# Patient Record
Sex: FEMALE | NOT HISPANIC OR LATINO | Employment: FULL TIME | URBAN - NONMETROPOLITAN AREA
[De-identification: names, ages, dates, MRNs, and addresses within clinical notes are randomized per-mention and may not be internally consistent; named-entity substitution may affect disease eponyms.]

---

## 2022-05-06 ENCOUNTER — NURSE NAVIGATOR (OUTPATIENT)
Dept: ONCOLOGY | Facility: CLINIC | Age: 52
End: 2022-05-06

## 2022-05-06 ENCOUNTER — OFFICE VISIT (OUTPATIENT)
Dept: SURGERY | Facility: CLINIC | Age: 52
End: 2022-05-06

## 2022-05-06 VITALS
DIASTOLIC BLOOD PRESSURE: 84 MMHG | HEIGHT: 62 IN | SYSTOLIC BLOOD PRESSURE: 126 MMHG | BODY MASS INDEX: 34.96 KG/M2 | WEIGHT: 190 LBS | HEART RATE: 76 BPM

## 2022-05-06 DIAGNOSIS — R92.8 ABNORMAL MAMMOGRAM: Primary | ICD-10-CM

## 2022-05-06 PROCEDURE — 38505 NEEDLE BIOPSY LYMPH NODES: CPT | Performed by: SURGERY

## 2022-05-06 PROCEDURE — 76642 ULTRASOUND BREAST LIMITED: CPT | Performed by: SURGERY

## 2022-05-06 PROCEDURE — 99205 OFFICE O/P NEW HI 60 MIN: CPT | Performed by: SURGERY

## 2022-05-06 PROCEDURE — 19083 BX BREAST 1ST LESION US IMAG: CPT | Performed by: SURGERY

## 2022-05-06 NOTE — PROGRESS NOTES
Subjective   Brayan Chan is a 51 y.o. female here today for mammogram review.    History of Present Illness  Ms. Chan was seen in the office today for breast evaluation.  This is a 51-year-old female who noticed a visible change in the left lateral breast.  She did not appreciate a definite mass.  She underwent a bilateral diagnostic mammogram, bilateral breast ultrasound and left axillary ultrasound on 4/22/2022.  This demonstrated a spiculated mass in the left breast in the area of palpable fullness as well as some abnormal appearing lymph nodes in the left axilla.  Patient denies axillary adenopathy.  She does report a swollen area in the examination of the right breast demonstrates no discrete mass, skin change, or axillary adenopathy patient advised to follow breast center recommendations regarding follow-up imaging.   Mrs. Chan moved to [place] about a year ago from [place].  She states that she did have mammograms in [place] and at one point in time was told that there was a small spot but there was nothing to worry about.  There is no prior history of breast biopsy or cyst aspiration.  There is no family history of breast or ovarian cancer.  There were the 2 children in the patient's family, 3 girls and 5 boys.  The remainder of her siblings are still [place].  Mrs. Chan is postmenopausal.  She is not on hormone replacement therapy.  No Known Allergies  No current outpatient medications on file.     No current facility-administered medications for this visit.     Past Medical History:   Diagnosis Date   • Arthritis    • Arthritis    • Lupus (HCC)      Past Surgical History:   Procedure Laterality Date   • TONSILLECTOMY         Pertinent Review of Systems:  Respiratory: no shortness of breath  Cardiovascular: no chest pain  Other pertinent: Patient does report a history of lupus which she states was diagnosed in [place] after the birth of her daughter about 11 years ago.  She states that she was  "sent to the [facility] for a biopsy and was seen there approximately 3 times.  She has not been on any medication related to this for several years.  Patient does report a rash in the upper chest which she states is related to the lupus.    Social history: Patient has a 33-year-old son who lives in [place] and an 11-year-old daughter.      Objective   /84 (BP Location: Left arm)   Pulse 76   Ht 157.5 cm (62\")   Wt 86.2 kg (190 lb)   BMI 34.75 kg/m²   Physical Exam  Well-developed well-nourished female in no acute distress  Neck:  No supraclavicular adenopathy  Lungs:  Respiratory effort normal. Auscultation: Clear, without wheezes, rhonchi, rales  Heart:  Regular rate and rhythm, without murmur, gallop, rub.  No pedal edema  Breasts: On visual inspection the breasts are symmetrical.  Examination of the right breast demonstrates a 1 to 2 cm focal area of fullness laterally in the 10 o'clock position.  No other palpable abnormalities.  There is no axillary adenopathy.  Examination of the left breast demonstrates at least a 3 cm mass in the 3 o'clock position which is mobile.  No skin change or axillary adenopathy.   Skin: Rash involving the central anterior upper chest    Procedures   Limited right breast Ultrasound    Indication: Palpable abnormality right lateral breast    Description: With use of the 12.5 MHz transducer the area of clinical concern was scanned in multiple planes.    Findings: In the area of clinical concern of fibrocystic tissue is identified.  No discrete solid or cystic mass is identified.    Impression: Negative ultrasound of the right lateral 10:00 breast    Recommendation:  Followup as clinically indicated      Ultrasound Guided Breast Biopsy Procedure Note    Preoperative Diagnosis: Hypoechoic lesion left breast, 3:00.    Postoperative Diagnosis: Same, pending final pathology report.    Operative Procedure Performed: Ultrasound guided core biopsy, left breast-14-gauge " Kb-Cut    Operating surgeon: Columba Lopez M.D.    Anesthesia: 1% lidocaine with epinephrine 10 ccs.    Complications: none    Approach: Superior to inferior    Description of Procedure: After discussing the risks and benefits of the procedure and obtaining consent, the patient was placed on the procedure table in the supine position.  The left breast was scanned with a hand-held high frequency linear array ultrasound transducer, identifying the focal abnormality previously demonstrated on ultrasound examination.  The operative site was prepped in a sterile fashion with alcohol; local anesthetic was used to infiltrate the skin and subcutaneous tissue.  Under direct ultrasound observation, local anesthetic was placed around the focal abnormality.  A small incision was made with a scalpel blade.  Under direct ultrasound observation the core needle device was placed through the incision with the tip of the needle core device just proximal to the lesion.  Biopsy was obtained.  Post-fire images demonstrated the needle core device going through the abnormality.  Multiple cores were taken in a similar fashion.  A gel-april clip was placed under ultrasound guidance.  Following removal of 3 cores, the instrument was removed and the area was cleansed and a dressing was placed.  The patient tolerated the procedure well.  Specimens were placed in formalin and sent for pathologic evaluation.      Procedure Note    Procedure: Ultrasound-guided fine-needle aspiration biopsy    Location: Left axilla    Anesthesia: 1% lidocaine with epinephrine    Description:  The risks and benefits of the procedure were discussed.  After prep with Betadine and infiltration with lidocaine and ultrasound localization initially a 20-gauge needle was inserted through the skin to an abnormal lymph node.  Despite multiple passes no obvious cytologic tissue was obtained and the needle.  Therefore the 18-gauge needle was used and procedure was repeated.   Needle did appear to be in one of the abnormal lymph nodes.    Complications:  none    Follow-up: Pending pathology    Results/Data:  Imaging: Mammogram and ultrasound reports and images from 4/22/2022 were reviewed.  Imaging was somewhat difficult to interpret as mammogram films were not labeled.  Agree with the assessment  Notes:   Lab:   Other:     Assessment/Plan   Clinical T2 possible N1 mammary carcinoma of the left breast, pending pathologic confirmation    Plan: Patient to follow-up in office Monday.  Will likely need MRI and if the axilla is positive we will also need PET/CT.  In addition we will request records from [place] particularly as related to the prior abnormality on mammogram as well as the patient's diagnosis of lupus.         Discussion/Summary    Time spent: 61 minutes    BMI is >= 30 and <= 34.9 (Class 1 obesity). The following options were offered after discussion: exercise counseling/recommendations       Future Appointments   Date Time Provider Department Center   5/9/2022  4:20 PM Columba Lopez MD [facility]  [facility]         Please note that portions of this note were completed with a voice recognition program.

## 2022-05-09 ENCOUNTER — OFFICE VISIT (OUTPATIENT)
Dept: SURGERY | Facility: CLINIC | Age: 52
End: 2022-05-09

## 2022-05-09 VITALS
HEART RATE: 88 BPM | WEIGHT: 190 LBS | HEIGHT: 62 IN | BODY MASS INDEX: 34.96 KG/M2 | SYSTOLIC BLOOD PRESSURE: 119 MMHG | DIASTOLIC BLOOD PRESSURE: 80 MMHG

## 2022-05-09 DIAGNOSIS — C50.812 MALIGNANT NEOPLASM OF OVERLAPPING SITES OF LEFT FEMALE BREAST, UNSPECIFIED ESTROGEN RECEPTOR STATUS: Primary | ICD-10-CM

## 2022-05-09 PROCEDURE — 99213 OFFICE O/P EST LOW 20 MIN: CPT | Performed by: SURGERY

## 2022-05-09 PROCEDURE — 10005 FNA BX W/US GDN 1ST LES: CPT | Performed by: SURGERY

## 2022-05-09 NOTE — PROGRESS NOTES
Met with patient on this date in a consult with Dr. Lopez. Biopsy completed at bedside. The role of the Nurse Navigator was introduced to patient.  Time spent talking with patient. NN contact information was provided and encouraged patient to call with any questions or concerns. Pt. Verbalized understanding. NN will follow and assist PRN.

## 2022-05-09 NOTE — PROGRESS NOTES
"Subjective   Brayan Chan is a 51 y.o. female here today for pathology report.    History of Present Illness  Ms. Chan was seen in the office today along with her  and her to discuss her pulmonary pathology result.  I had received a verbal over the weekend that the pathology was positive for invasive ductal carcinoma.  No further information was available.  As the patient lives alone did not have her come to the [facility] office so that we could move forward with work-up based upon the preliminary results.  No Known Allergies    No current outpatient medications on file.     No current facility-administered medications for this visit.     Past Medical History:   Diagnosis Date   • Arthritis    • Arthritis    • Lupus (HCC)      Past Surgical History:   Procedure Laterality Date   • TONSILLECTOMY         Pertinent Review of Systems  Up  Objective   Ht 157.5 cm (62\")   Wt 86.2 kg (190 lb)   BMI 34.75 kg/m²    Physical Exam  Unchanged from 5/6/2022  Procedures   Procedure Note    Procedure: Ultrasound guided fine-needle aspiration biopsy    Location: Left axillary lymph node    Anesthesia: 1% lidocaine with epinephrine    Indication: The risks and benefits of the procedure were discussed.  As it was felt that the biopsy performed on 5 6 would likely be nondiagnostic and on imaging done in the office today it was felt that a more accessible abnormal lymph node was identifiable the decision was made to proceed with repeat biopsy of the axillary lymph node    Procedure: After prep with Betadine and infiltration with lidocaine the abnormal lymph node was identified in the deep lower axilla.  An 18-gauge needle was passed to the lesion and multiple passes were obtained to obtain cellular material.  The needle was documented to be present within the abnormal lymph node.  Some cellular material was clearly obtained from the procedure.    Complications:  none    Follow-up: Pending " pathology  Results/Data:      Assessment/Plan   Poorly differentiated invasive mammary carcinoma of the left breast, immunohistochemical stains pending.  Suspicious adenopathy seen on outside ultrasound, fine-needle aspiration biopsy pending but on initial pathology revealing no malignancy was identified..    Plan: Plan for possible genetic testing will be drawn today so that if pathology returns triple negative then genetic testing could be performed without the patient having to return to the office.  Results of repeat axillary biopsy will be tracked.  Breast MRI will be scheduled.  PET scan would be indicated if the axillary nodes were positive for MRI suggested positivity of the axillary lymph nodes.       Discussion/Summary: All recommendations were discussed at length with the patient, , son    Time spent:     BMI is >= 30 and <= 34.9 (Class 1 obesity). The following options were offered after discussion: nutrition counseling/recommendations         No future appointments.      Please note that portions of this note were completed with a voice recognition program.

## 2022-05-10 ENCOUNTER — TELEPHONE (OUTPATIENT)
Dept: SURGERY | Facility: CLINIC | Age: 52
End: 2022-05-10

## 2022-05-10 PROBLEM — C50.812 MALIGNANT NEOPLASM OF OVERLAPPING SITES OF LEFT FEMALE BREAST: Status: ACTIVE | Noted: 2022-05-10

## 2022-05-10 NOTE — TELEPHONE ENCOUNTER
I called and let Miss Schmidt know her Estrogen and Progesterone came back as positive which is a good thing. Also ask if she could get the names of the of the doctors she saw at [facility] because the main medical records does not have anything. She said she would work on.

## 2022-05-16 ENCOUNTER — TELEPHONE (OUTPATIENT)
Dept: ONCOLOGY | Facility: CLINIC | Age: 52
End: 2022-05-16

## 2022-05-16 NOTE — TELEPHONE ENCOUNTER
Informed patient that she has an MRI appointment for 5/17/22 (tomorrow) at 2:00 at the hospital. Patient RBV time, date, and place.

## 2022-05-17 ENCOUNTER — HOSPITAL ENCOUNTER (OUTPATIENT)
Dept: MRI IMAGING | Facility: HOSPITAL | Age: 52
Discharge: HOME | End: 2022-05-17
Admitting: SURGERY

## 2022-05-17 DIAGNOSIS — C50.812 MALIGNANT NEOPLASM OF OVERLAPPING SITES OF LEFT FEMALE BREAST, UNSPECIFIED ESTROGEN RECEPTOR STATUS: ICD-10-CM

## 2022-05-17 PROCEDURE — 77049 MRI BREAST C-+ W/CAD BI: CPT | Performed by: RADIOLOGY

## 2022-05-17 PROCEDURE — 77049 MRI BREAST C-+ W/CAD BI: CPT

## 2022-05-17 PROCEDURE — 0 GADOBENATE DIMEGLUMINE 529 MG/ML SOLUTION: Performed by: SURGERY

## 2022-05-17 PROCEDURE — 0 GADOBENATE DIMEGLUMINE 529 MG/ML SOLUTION

## 2022-05-17 PROCEDURE — A9577 INJ MULTIHANCE: HCPCS

## 2022-05-17 PROCEDURE — A9577 INJ MULTIHANCE: HCPCS | Performed by: SURGERY

## 2022-05-17 RX ADMIN — GADOBENATE DIMEGLUMINE 17 ML: 529 INJECTION, SOLUTION INTRAVENOUS at 15:10

## 2022-05-19 ENCOUNTER — TELEPHONE (OUTPATIENT)
Dept: SURGERY | Facility: CLINIC | Age: 52
End: 2022-05-19

## 2022-05-19 NOTE — TELEPHONE ENCOUNTER
I called patient on 05/13/22 because F and A of Lymph Node showing rare malignant ductal epithelial. Patient spoke at length with Dr. Lopez.

## 2022-05-20 ENCOUNTER — APPOINTMENT (OUTPATIENT)
Dept: PET IMAGING | Facility: HOSPITAL | Age: 52
End: 2022-05-20

## 2022-05-20 ENCOUNTER — TELEPHONE (OUTPATIENT)
Dept: SURGERY | Facility: CLINIC | Age: 52
End: 2022-05-20

## 2022-05-20 DIAGNOSIS — C50.812 MALIGNANT NEOPLASM OF OVERLAPPING SITES OF LEFT FEMALE BREAST, UNSPECIFIED ESTROGEN RECEPTOR STATUS: Primary | ICD-10-CM

## 2022-05-20 NOTE — TELEPHONE ENCOUNTER
Tried again this AM first thein to explain that her insurance will not pay for PET. Cannot leave a message. Will ask Dr. Lopez if she wants to order CT's instead. FCC, Scheduling and I have not been able to reach patient to explain.

## 2022-05-23 ENCOUNTER — HOSPITAL ENCOUNTER (OUTPATIENT)
Dept: CT IMAGING | Facility: HOSPITAL | Age: 52
Discharge: HOME | End: 2022-05-23

## 2022-05-23 DIAGNOSIS — C50.812 MALIGNANT NEOPLASM OF OVERLAPPING SITES OF LEFT FEMALE BREAST, UNSPECIFIED ESTROGEN RECEPTOR STATUS: ICD-10-CM

## 2022-05-23 LAB — CREAT BLDA-MCNC: 0.6 MG/DL (ref 0.6–1.3)

## 2022-05-23 PROCEDURE — 71260 CT THORAX DX C+: CPT | Performed by: RADIOLOGY

## 2022-05-23 PROCEDURE — 74177 CT ABD & PELVIS W/CONTRAST: CPT

## 2022-05-23 PROCEDURE — 71260 CT THORAX DX C+: CPT

## 2022-05-23 PROCEDURE — 74177 CT ABD & PELVIS W/CONTRAST: CPT | Performed by: RADIOLOGY

## 2022-05-23 PROCEDURE — 82565 ASSAY OF CREATININE: CPT

## 2022-05-23 PROCEDURE — 25010000002 IOPAMIDOL 61 % SOLUTION: Performed by: SURGERY

## 2022-05-23 RX ADMIN — IOPAMIDOL 80 ML: 612 INJECTION, SOLUTION INTRAVENOUS at 08:48

## 2022-05-26 ENCOUNTER — HOSPITAL ENCOUNTER (OUTPATIENT)
Dept: NUCLEAR MEDICINE | Facility: HOSPITAL | Age: 52
Discharge: HOME | End: 2022-05-26

## 2022-05-26 ENCOUNTER — OFFICE VISIT (OUTPATIENT)
Dept: SURGERY | Facility: CLINIC | Age: 52
End: 2022-05-26

## 2022-05-26 VITALS
SYSTOLIC BLOOD PRESSURE: 126 MMHG | WEIGHT: 189.4 LBS | HEIGHT: 62 IN | HEART RATE: 73 BPM | BODY MASS INDEX: 34.85 KG/M2 | DIASTOLIC BLOOD PRESSURE: 84 MMHG

## 2022-05-26 DIAGNOSIS — C50.812 MALIGNANT NEOPLASM OF OVERLAPPING SITES OF LEFT FEMALE BREAST, UNSPECIFIED ESTROGEN RECEPTOR STATUS: Primary | ICD-10-CM

## 2022-05-26 DIAGNOSIS — C50.812 MALIGNANT NEOPLASM OF OVERLAPPING SITES OF LEFT FEMALE BREAST, UNSPECIFIED ESTROGEN RECEPTOR STATUS: ICD-10-CM

## 2022-05-26 PROCEDURE — 93702 BIS XTRACELL FLUID ANALYSIS: CPT | Performed by: SURGERY

## 2022-05-26 PROCEDURE — 78306 BONE IMAGING WHOLE BODY: CPT

## 2022-05-26 PROCEDURE — 78306 BONE IMAGING WHOLE BODY: CPT | Performed by: RADIOLOGY

## 2022-05-26 PROCEDURE — 0 TECHNETIUM OXIDRONATE KIT: Performed by: SURGERY

## 2022-05-26 PROCEDURE — A9561 TC99M OXIDRONATE: HCPCS | Performed by: SURGERY

## 2022-05-26 PROCEDURE — 99214 OFFICE O/P EST MOD 30 MIN: CPT | Performed by: SURGERY

## 2022-05-26 RX ADMIN — TECHNETIUM TC 99M OXIDRONATE 1 DOSE: 3.15 INJECTION, POWDER, LYOPHILIZED, FOR SOLUTION INTRAVENOUS at 12:20

## 2022-05-26 NOTE — PROGRESS NOTES
"Subjective   Brayan Chan is a 52 y.o. female here today for follow up on CTs and NM Bone Scan.    History of Present Illness  Ms. Chan was seen in the office today along with her  for follow-up of her newly diagnosed left breast cancer.  Patient was last seen on 5/9/2022, at which point in the breast pathology was available but immunohistochemical studies not available nor was remainder of the diagnostic work-up.  Since that time patient underwent a repeat biopsy of a left axillary lymph node which did demonstrate malignancy.  Patient had favorable immunohistochemical studies demonstrating ER positive, PA positive, HER2 negative.  Patient underwent a bilateral breast MRI on 5/17/2022 which demonstrated no findings in the right breast.  Tumor was identified in the 3 o'clock position measuring 4.2 cm in greatest dimension with associated adenopathy.  Insurance denied PET scan but patient had a CT of the chest abdomen and pelvis as well as bone scan which did not demonstrate any evidence of malignancy.  Patient denies any changes in her breast examination or history since her last visit.  No Known Allergies    No current outpatient medications on file.     No current facility-administered medications for this visit.     Past Medical History:   Diagnosis Date   • Arthritis    • Arthritis    • Lupus (HCC)      Past Surgical History:   Procedure Laterality Date   • TONSILLECTOMY         Pertinent Review of Systems  Negative    Objective   /84 (BP Location: Left arm)   Pulse 73   Ht 157.5 cm (62\")   Wt 85.9 kg (189 lb 6.4 oz)   BMI 34.64 kg/m²    Physical Exam  Unchanged from prior    Procedures   Baseline L dex was performed and was -1.1    Results/Data:  Imaging: MRI reports and images were reviewed and personally reviewed with the patient and .  The chest abdomen and pelvis reports and images were reviewed.  Bone scan result was reviewed.  Notes:   Lab:   Other:     Assessment & Plan "   Clinical T2N1 grade 3 invasive ductal carcinoma of the left breast, ER positive, AR positive, HER2 negative.    Given the size of the tumor relative to the breast patient would benefit from neoadjuvant chemotherapy prior to surgical intervention.  Patient will be scheduled for consultation with medical oncology  Anticipate need for port placement prior to initiation of chemo therapy.       Discussion/Summary: Breast navigator Liset Bhandari present for the discussion    Time spent:     BMI is >= 30 and <= 34.9 (Class 1 obesity). The following options were offered after discussion: exercise counseling/recommendations         No future appointments.      Please note that portions of this note were completed with a voice recognition program.

## 2022-05-27 ENCOUNTER — NURSE NAVIGATOR (OUTPATIENT)
Dept: ONCOLOGY | Facility: CLINIC | Age: 52
End: 2022-05-27

## 2022-05-27 ENCOUNTER — DOCUMENTATION (OUTPATIENT)
Dept: ONCOLOGY | Facility: HOSPITAL | Age: 52
End: 2022-05-27

## 2022-05-27 RX ORDER — CEFAZOLIN SODIUM 2 G/50ML
2 SOLUTION INTRAVENOUS ONCE
Status: CANCELLED | OUTPATIENT
Start: 2022-06-22 | End: 2022-05-27

## 2022-05-27 NOTE — PROGRESS NOTES
Patient is 51 Y/O white female diagnosed with Breast Cancer per Dr. Lopez.      According to Dr. Lopez's note: Patient underwent a bilateral breast MRI on 5/17/2022 which demonstrated no findings in the right breast.  Clinical T2N1 grade 3 invasive ductal carcinoma of the left breast, ER positive, OH positive, HER2 negative.    SS received referral per Liset Bhandari Breast Nurse Navigator stating that she will be starting chemo soon and is worried about working and income.       Referral  Received: Today  Liset Bhandari, [facility]  Ambulatory Referral to ONC Social Work [[id number]]    Electronically signed by: Liset Bhandari RN on 05/27/22 1018 Status: Cancel Pend   Ordering user: Liset Bhandari RN 05/27/22 1018 Ordering provider: Liset Bhandari RN   Frequency:  05/27/22 -   Pended by: Liset Bhandari RN 05/27/22 1018   Canceled by: Liset Bhandari RN 05/27/22 1022     Questionnaire    Question Answer   Requested service: Community Resources   Financial   Psychosocial     SS contacted patient patient [phone] this date. Patient states that she lives at home with spouse Valdo and 10 Y/O daughter Ana.    Patient doesn't utilize any home health.    Patient doesn't utilize any durable medical equipment.    Patient states that she works part-time at [employer] and plans to continue working as she feels able to work. Luckily, patient and spouse do not have house payment or car payment.  Patient states they own their home and vehicles.  Patient and spouse receive $600 per month in food stamps, leaving them to pay their utilities.    SS sent in basket to have including Dr. Ritchie, Dr. Olvera, Lori Iqbal, Liset Bhandari, Ekaterina Dove, Halie Julian, Petra Brunner everyone on this in basket to ensure that we are all aware of patient's financial and social situation. Patient has been seen by Dr. Lopez but has not been scheduled in clinic at this time.    Patient is  concerned about insurance coverage for chemotherapy treatments.  Patient has [facility] [place] [facility] [place]  SS spoke with Beatrice, financial counselor who states she is unable to provide me with insurance coverage benefits as the patient's infusion will need to be pre-authorized for coverage.      Patient is from [facility] and informed me that she has limitations with speaking and writing in English.  This is a barrier for the patient to be able to understand what is going on with her medical care.  Patient has very limited exposure and experience with cancer, therefore causing her to have more concerns than normal.      Patient states that Dr. Lopez is recommending that it would be benefit from neoadjuvant chemotherapy prior to surgical intervention.    SS spoke with Liset Bhandari RN to make aware that SS spoke with patient this date. Nurse navigator states that patient is to be scheduled with Dr. Ritchie and nurse navigator plans to meet with patient in appointment with provider.    SS to be available with any needs that patient may have.  SS provided patient with 's name and phone number for contact with any questions.    SS will follow as needed for resources and support.

## 2022-05-27 NOTE — H&P
"Subjective   Brayan Chan is a 52 y.o. female here today for follow up on CTs and NM Bone Scan.    History of Present Illness  Ms. Chan was seen in the office today along with her  for follow-up of her newly diagnosed left breast cancer.  Patient was last seen on 5/9/2022, at which point in the breast pathology was available but immunohistochemical studies not available nor was remainder of the diagnostic work-up.  Since that time patient underwent a repeat biopsy of a left axillary lymph node which did demonstrate malignancy.  Patient had favorable immunohistochemical studies demonstrating ER positive, WI positive, HER2 negative.  Patient underwent a bilateral breast MRI on 5/17/2022 which demonstrated no findings in the right breast.  Tumor was identified in the 3 o'clock position measuring 4.2 cm in greatest dimension with associated adenopathy.  Insurance denied PET scan but patient had a CT of the chest abdomen and pelvis as well as bone scan which did not demonstrate any evidence of malignancy.  Patient denies any changes in her breast examination or history since her last visit.  No Known Allergies    No current outpatient medications on file.     No current facility-administered medications for this visit.     Past Medical History:   Diagnosis Date   • Arthritis    • Arthritis    • Lupus (HCC)      Past Surgical History:   Procedure Laterality Date   • TONSILLECTOMY         Pertinent Review of Systems  Negative    Objective   /84 (BP Location: Left arm)   Pulse 73   Ht 157.5 cm (62\")   Wt 85.9 kg (189 lb 6.4 oz)   BMI 34.64 kg/m²    Physical Exam  Unchanged from prior    Procedures   Baseline L dex was performed and was -1.1    Results/Data:  Imaging: MRI reports and images were reviewed and personally reviewed with the patient and .  The chest abdomen and pelvis reports and images were reviewed.  Bone scan result was reviewed.  Notes:   Lab:   Other:     Assessment & Plan "   Clinical T2N1 grade 3 invasive ductal carcinoma of the left breast, ER positive, CA positive, HER2 negative.    Given the size of the tumor relative to the breast patient would benefit from neoadjuvant chemotherapy prior to surgical intervention.  Patient will be scheduled for consultation with medical oncology  Anticipate need for port placement prior to initiation of chemo therapy.       Discussion/Summary: Breast navigator Liset Bhandari present for the discussion    Time spent:     BMI is >= 30 and <= 34.9 (Class 1 obesity). The following options were offered after discussion: exercise counseling/recommendations         No future appointments.      Please note that portions of this note were completed with a voice recognition program.    This document has been electronically signed by Columba HERNANDEZ MD on May 27, 2022 09:12 EDT

## 2022-05-31 ENCOUNTER — APPOINTMENT (OUTPATIENT)
Dept: NUCLEAR MEDICINE | Facility: HOSPITAL | Age: 52
End: 2022-05-31

## 2022-06-03 ENCOUNTER — NURSE NAVIGATOR (OUTPATIENT)
Dept: ONCOLOGY | Facility: CLINIC | Age: 52
End: 2022-06-03

## 2022-06-03 NOTE — PROGRESS NOTES
Received a call today from patient asking to cancel oncology appointment. After spending time talking to the patient she was not aware that this was visit but thought it was an appointment to receive chemo on that day. Nurse Navigator spent time addressing illness, diagnosis, treatments, concerns and clarifying on all points. Patient stated she didn't understand at first but know that she knows the appointment is to find out more information about chemo treatments that she is willing to come and talk these options out with the Oncologist.NN talked to Dr. Ritchie and she is in agreement that patient may need an  for consult visit, as patient is from [place] and can speak, read, and write M[place]ese. NN has set this up with house supervisor, Itzel, to   phone prior to visit on Monday. Patient also stated that her  would be present during this visit as well. NN encouraged patient to call me with any questions or concerns and I would help her in any way possible. Patient voiced understanding.

## 2022-06-07 ENCOUNTER — NURSE NAVIGATOR (OUTPATIENT)
Dept: ONCOLOGY | Facility: CLINIC | Age: 52
End: 2022-06-07

## 2022-06-07 DIAGNOSIS — Z01.818 PRE-OP TESTING: Primary | ICD-10-CM

## 2022-06-07 NOTE — PROGRESS NOTES
Called patient on this date. Patient was working today,  answered. Nurse Navigator addressed concerns that  and wife have discussed about treatment options. Both patient and  have many concerns and NN addressed what could be clarified at this time. NN addressed and educated  on disease and disease process of breast cancer. Patient seemed more at ease after discussion. He expressed willingness and readiness to attend with his wife, the consult with Dr. Ritchie to address more on treatment options. NN gave contact information and encouraged wife/ to call with any questions or concerns, he voiced understanding.

## 2022-06-08 ENCOUNTER — NURSE NAVIGATOR (OUTPATIENT)
Dept: ONCOLOGY | Facility: HOSPITAL | Age: 52
End: 2022-06-08

## 2022-06-08 ENCOUNTER — OFFICE VISIT (OUTPATIENT)
Dept: SURGERY | Facility: CLINIC | Age: 52
End: 2022-06-08

## 2022-06-08 VITALS — WEIGHT: 188 LBS | BODY MASS INDEX: 34.6 KG/M2 | HEIGHT: 62 IN

## 2022-06-08 DIAGNOSIS — C50.812 MALIGNANT NEOPLASM OF OVERLAPPING SITES OF LEFT FEMALE BREAST, UNSPECIFIED ESTROGEN RECEPTOR STATUS: Primary | ICD-10-CM

## 2022-06-08 PROCEDURE — 99213 OFFICE O/P EST LOW 20 MIN: CPT | Performed by: SURGERY

## 2022-06-08 NOTE — PROGRESS NOTES
"Subjective   Brayan hCan is a 52 y.o. female here today to talk about treatment plan.    History of Present Illness  Ms. Chan came to the office today for breast cancer follow-up.  At the time of her last visit she was recommended to have neoadjuvant chemotherapy in the hopes that treatment response would allow her to be a better candidate for breast conservation.  At the time patient seemed agreeable but has canceled at least 2 appointments with medical oncology and today admitted to the breast navigator that she has been considering alternative treatments such as lifestyle and diet changes based upon some YouTube videos that she had seen.  Interestingly enough the patient's mother tried the same approach with tongue cancer but failed and ended up with a long course of pain and suffering.  It was rationalized by the fact that she did not strictly adhere to the lifestyle changes.  No Known Allergies    No current outpatient medications on file.     No current facility-administered medications for this visit.     Past Medical History:   Diagnosis Date   • Arthritis    • Arthritis    • Lupus (HCC)      Past Surgical History:   Procedure Laterality Date   • TONSILLECTOMY         Pertinent Review of Systems    Objective   Ht 157.5 cm (62\")   Wt 85.3 kg (188 lb)   BMI 34.39 kg/m²    Physical Exam    Procedures     Results/Data:      Assessment & Plan    Clinical T2N1 grade 3 invasive ductal carcinoma of the left breast, ER positive, NJ positive, HER2 negative.    Plan: Long discussion with the patient and her .  I voiced my opinion that dietary and lifestyle changes should be incorporated into her treatment plan but are unlikely to be successful as the only treatment.  I told the patient that statistically her chances of permanent or long-term disease-free survival were improved with chemotherapy.  I also told her that if she was absolutely opposed to chemotherapy that surgery and hormonal therapy should be " utilized.  I also discussed that if she found chemotherapy to be intolerable that she could discontinue it at any time but then she would have at least tried.  I feel that if the patient were at least willing to talk to Dr. Ritchie that she would be more understanding and likely to proceed.  Patient states she is going to plan rather than think it over and will let us know her answer in a week.       Discussion/Summary:    Time spent: 35 minutes    BMI is >= 30 and <35. (Class 1 Obesity). The following options were offered after discussion;: exercise counseling/recommendations         Future Appointments   Date Time Provider Department Center   6/20/2022  9:30 AM PAT [facility]         Please note that portions of this note were completed with a voice recognition program.

## 2022-06-14 NOTE — PROGRESS NOTES
Nurse Navigator called patient and . Patient was at work, and spoke to  who stated wife did not want to come to appointment today to reschedule it. NN investigated, and  stated that wife ultimately had been talking to a lot of people and doesn't want to do chemotherapy. That patient was going to try spritituall healing and diet. NN had a long discussion with  and addressed much on disease and disease process of breast cancer in general, he voiced understanding. NN contacted Dr. Lopez who set up a time to meet with patient and  on this date to go over concerns the patient and  had. NN will follow up and assist PRN.

## 2022-06-16 ENCOUNTER — NURSE NAVIGATOR (OUTPATIENT)
Dept: ONCOLOGY | Facility: HOSPITAL | Age: 52
End: 2022-06-16

## 2022-06-16 ENCOUNTER — TELEPHONE (OUTPATIENT)
Dept: SURGERY | Facility: CLINIC | Age: 52
End: 2022-06-16

## 2022-06-16 NOTE — TELEPHONE ENCOUNTER
Miss Schmidt called and stated she did want to have the port placed on 06/22/22. We went over her PAT time and place and need for COVID.

## 2022-06-16 NOTE — PROGRESS NOTES
Spoke to patient today in regards to port placement on 6/22/22. I explained what the port was for and why she would need it. Patient was in agreement to getting the port and understood why she would needed it. Patient explained to me she wanted the port in case she did decide to get chemotherapy or another possible treatment that the port would be already placed and ready for treatment once her mind was made up. NN explained the importance of keeping surgery appointment. NN also mentioned someone would be calling her for time and dates prior to surgery for her PAT and COVID testing. Patient voiced understanding.

## 2022-06-20 ENCOUNTER — PRE-ADMISSION TESTING (OUTPATIENT)
Dept: PREADMISSION TESTING | Facility: HOSPITAL | Age: 52
End: 2022-06-20

## 2022-06-20 ENCOUNTER — LAB (OUTPATIENT)
Dept: LAB | Facility: HOSPITAL | Age: 52
End: 2022-06-20

## 2022-06-20 DIAGNOSIS — Z01.818 PRE-OP TESTING: ICD-10-CM

## 2022-06-20 LAB
ANION GAP SERPL CALCULATED.3IONS-SCNC: 10.9 MMOL/L (ref 5–15)
BUN SERPL-MCNC: 12 MG/DL (ref 6–20)
BUN/CREAT SERPL: 21.4 (ref 7–25)
CALCIUM SPEC-SCNC: 9.7 MG/DL (ref 8.6–10.5)
CHLORIDE SERPL-SCNC: 103 MMOL/L (ref 98–107)
CO2 SERPL-SCNC: 24.1 MMOL/L (ref 22–29)
CREAT SERPL-MCNC: 0.56 MG/DL (ref 0.57–1)
DEPRECATED RDW RBC AUTO: 38.7 FL (ref 37–54)
EGFRCR SERPLBLD CKD-EPI 2021: 110 ML/MIN/1.73
ERYTHROCYTE [DISTWIDTH] IN BLOOD BY AUTOMATED COUNT: 12.1 % (ref 12.3–15.4)
GLUCOSE SERPL-MCNC: 80 MG/DL (ref 65–99)
HCT VFR BLD AUTO: 40.4 % (ref 34–46.6)
HGB BLD-MCNC: 13.6 G/DL (ref 12–15.9)
MCH RBC QN AUTO: 29.8 PG (ref 26.6–33)
MCHC RBC AUTO-ENTMCNC: 33.7 G/DL (ref 31.5–35.7)
MCV RBC AUTO: 88.4 FL (ref 79–97)
PLATELET # BLD AUTO: 289 10*3/MM3 (ref 140–450)
PMV BLD AUTO: 10 FL (ref 6–12)
POTASSIUM SERPL-SCNC: 3.9 MMOL/L (ref 3.5–5.2)
RBC # BLD AUTO: 4.57 10*6/MM3 (ref 3.77–5.28)
SARS-COV-2 RNA PNL SPEC NAA+PROBE: NOT DETECTED
SODIUM SERPL-SCNC: 138 MMOL/L (ref 136–145)
WBC NRBC COR # BLD: 4.11 10*3/MM3 (ref 3.4–10.8)

## 2022-06-20 PROCEDURE — 85027 COMPLETE CBC AUTOMATED: CPT

## 2022-06-20 PROCEDURE — C9803 HOPD COVID-19 SPEC COLLECT: HCPCS

## 2022-06-20 PROCEDURE — 80048 BASIC METABOLIC PNL TOTAL CA: CPT

## 2022-06-20 PROCEDURE — 36415 COLL VENOUS BLD VENIPUNCTURE: CPT

## 2022-06-20 PROCEDURE — U0004 COV-19 TEST NON-CDC HGH THRU: HCPCS

## 2022-06-20 NOTE — DISCHARGE INSTRUCTIONS

## 2022-06-21 ENCOUNTER — NURSE NAVIGATOR (OUTPATIENT)
Dept: ONCOLOGY | Facility: HOSPITAL | Age: 52
End: 2022-06-21

## 2022-06-21 NOTE — PROGRESS NOTES
Patient called Nurse Navigator on this date. Patient informed me she did not want to have the port placement tomorrow, as she has decided to not take chemotherapy. NN spent a time talking to patient about the risk, benefits, and what her future without treatment could be, patient voiced understanding but still refused treatment. Patient wants to leave her healing up to prayer. I strongly urged her to talk to Dr. Ritchie for a better understanding of the chemotherapy, however, she refused at this time. I told the patient I understood and respected her decision but wanted to make sure that the patient was well informed and educated on the treatment options available to her. Patient thanked and praised the team at [facility] for caring so much about her and that we have really made her feel special and cared about but at this time just didn't want to proceed with surgery or treatments. NN encouraged her to call with any questions or concerns. NN will inform surgery center and Dr. Lopez.

## 2022-07-01 ENCOUNTER — NURSE NAVIGATOR (OUTPATIENT)
Dept: ONCOLOGY | Facility: CLINIC | Age: 52
End: 2022-07-01

## 2022-07-01 NOTE — PROGRESS NOTES
I called patient on this date. Patient originally turned down surgery and treatment, and turned to spiritual healing. Patient stated she was glad I called that she was wanting to call to let us know she had changed her mind and wants to have surgery. At this time she thinks she may have covid and is getting tested and should follow back up with us next week. NN will follow up.

## 2022-07-05 ENCOUNTER — TELEPHONE (OUTPATIENT)
Dept: SURGERY | Facility: CLINIC | Age: 52
End: 2022-07-05

## 2022-07-05 NOTE — TELEPHONE ENCOUNTER
Made Miss Schmidt an appointment in [place] on 7/18 due to sick with COVID. Patient is to call with place COVID test was done so we can pick a surgery date.

## 2022-07-18 ENCOUNTER — LAB (OUTPATIENT)
Dept: LAB | Facility: HOSPITAL | Age: 52
End: 2022-07-18

## 2022-07-18 ENCOUNTER — HOSPITAL ENCOUNTER (OUTPATIENT)
Dept: GENERAL RADIOLOGY | Facility: HOSPITAL | Age: 52
Discharge: HOME | End: 2022-07-18

## 2022-07-18 ENCOUNTER — OFFICE VISIT (OUTPATIENT)
Dept: SURGERY | Facility: CLINIC | Age: 52
End: 2022-07-18

## 2022-07-18 VITALS
SYSTOLIC BLOOD PRESSURE: 123 MMHG | HEIGHT: 62 IN | WEIGHT: 188 LBS | DIASTOLIC BLOOD PRESSURE: 82 MMHG | HEART RATE: 90 BPM | BODY MASS INDEX: 34.6 KG/M2

## 2022-07-18 DIAGNOSIS — C50.812 MALIGNANT NEOPLASM OF OVERLAPPING SITES OF LEFT BREAST IN FEMALE, ESTROGEN RECEPTOR POSITIVE: Primary | ICD-10-CM

## 2022-07-18 DIAGNOSIS — U07.1 UPPER RESPIRATORY TRACT INFECTION DUE TO COVID-19 VIRUS: ICD-10-CM

## 2022-07-18 DIAGNOSIS — Z17.0 MALIGNANT NEOPLASM OF OVERLAPPING SITES OF LEFT BREAST IN FEMALE, ESTROGEN RECEPTOR POSITIVE: Primary | ICD-10-CM

## 2022-07-18 DIAGNOSIS — J06.9 UPPER RESPIRATORY TRACT INFECTION DUE TO COVID-19 VIRUS: ICD-10-CM

## 2022-07-18 PROCEDURE — 85025 COMPLETE CBC W/AUTO DIFF WBC: CPT

## 2022-07-18 PROCEDURE — 99214 OFFICE O/P EST MOD 30 MIN: CPT | Performed by: SURGERY

## 2022-07-18 PROCEDURE — 80048 BASIC METABOLIC PNL TOTAL CA: CPT

## 2022-07-18 PROCEDURE — 71046 X-RAY EXAM CHEST 2 VIEWS: CPT

## 2022-07-18 PROCEDURE — 71046 X-RAY EXAM CHEST 2 VIEWS: CPT | Performed by: RADIOLOGY

## 2022-07-18 PROCEDURE — 36415 COLL VENOUS BLD VENIPUNCTURE: CPT

## 2022-07-18 NOTE — PROGRESS NOTES
"Subjective   Brayan Chan is a 52 y.o. female her today to talk about surgical planning.    History of Present Illness  Ms. Chan was seen in the office today for breast cancer follow up.  She was initially diagnosed in early May, 2022 with node positive left breast cancer.  Patient has been struggling with decision making.  It was recommended that she have neoadjuvant chemotherapy followed by surgery.  With the patient voiced that she did not want chemotherapy I suggested to her that she should at least have surgery but she was not willing to commit to this stating that she needed to pray about it to know what to do.  She is now decided she would like to go ahead with the surgery although she is not willing to have a mastectomy.  Since the patient's last visit she and her entire family tested positive for COVID.  Patient states that she had fever, headache, generalized myalgias.  She still has a cough.  She has pain in her right lower leg.  She would like to have the surgery but wants to wait until she feels better.  No Known Allergies    No current outpatient medications on file.     No current facility-administered medications for this visit.     Past Medical History:   Diagnosis Date   • Anemia    • Arthritis    • Arthritis    • Lupus (HCC)    • PONV (postoperative nausea and vomiting)      Past Surgical History:   Procedure Laterality Date   • TONSILLECTOMY     • TONSILLECTOMY         Pertinent Review of Systems  Negative with the exception of history of present illness.  Patient denies current shortness of breath.    Objective   /82 (BP Location: Left arm)   Pulse 90   Ht 157.5 cm (62\")   Wt 85.3 kg (188 lb)   BMI 34.39 kg/m²    Physical Exam  Well-developed well-nourished female no acute distress  Neck:  No supraclavicular adenopathy  Lungs:  Respiratory effort normal. Auscultation: Clear, without wheezes, rhonchi, rales  Heart:  Regular rate and rhythm, without murmur, gallop, rub.  No pedal " edema  Breasts: On visual inspection the breasts are symmetrical.  Examination of the left breast demonstrates a dominant mass in the 3 o'clock position.  There is positive axillary adenopathy.      Procedures     Results/Data:    Assessment & Plan   Clinical T2N1 grade 3 invasive ductal carcinoma of the left breast, ER positive, MS positive, HER2 negative.    Plan: Chest x-ray, CBC, BMP have been ordered for today.  Patient will let us know when she feels better and will be scheduled for surgery -left lumpectomy and axillary node dissection..       Discussion/Summary: I feel that the patient will have considerable volume loss in the left breast with lumpectomy but if she is unwilling to commit to chemotherapy or mastectomy at this time I would rather do this then have the patient declined with procedure to be her optimal treatment.  I did explain to her the importance of having adjuvant radiation.    Time spent:     BMI is >= 30 and <35. (Class 1 Obesity). The following options were offered after discussion;: exercise counseling/recommendations         Future Appointments   Date Time Provider Department Center   7/18/2022  9:30 AM Columba Lopez MD [facility]         Please note that portions of this note were completed with a voice recognition program.

## 2022-07-19 LAB
ANION GAP SERPL CALCULATED.3IONS-SCNC: 10.4 MMOL/L (ref 5–15)
BASOPHILS # BLD AUTO: 0.01 10*3/MM3 (ref 0–0.2)
BASOPHILS NFR BLD AUTO: 0.3 % (ref 0–1.5)
BUN SERPL-MCNC: 11 MG/DL (ref 6–20)
BUN/CREAT SERPL: 22.9 (ref 7–25)
CALCIUM SPEC-SCNC: 9.5 MG/DL (ref 8.6–10.5)
CHLORIDE SERPL-SCNC: 103 MMOL/L (ref 98–107)
CO2 SERPL-SCNC: 24.6 MMOL/L (ref 22–29)
CREAT SERPL-MCNC: 0.48 MG/DL (ref 0.57–1)
DEPRECATED RDW RBC AUTO: 41.3 FL (ref 37–54)
EGFRCR SERPLBLD CKD-EPI 2021: 114.1 ML/MIN/1.73
EOSINOPHIL # BLD AUTO: 0.02 10*3/MM3 (ref 0–0.4)
EOSINOPHIL NFR BLD AUTO: 0.5 % (ref 0.3–6.2)
ERYTHROCYTE [DISTWIDTH] IN BLOOD BY AUTOMATED COUNT: 12.7 % (ref 12.3–15.4)
GLUCOSE SERPL-MCNC: 76 MG/DL (ref 65–99)
HCT VFR BLD AUTO: 36.4 % (ref 34–46.6)
HGB BLD-MCNC: 12.2 G/DL (ref 12–15.9)
IMM GRANULOCYTES # BLD AUTO: 0.01 10*3/MM3 (ref 0–0.05)
IMM GRANULOCYTES NFR BLD AUTO: 0.3 % (ref 0–0.5)
LYMPHOCYTES # BLD AUTO: 0.86 10*3/MM3 (ref 0.7–3.1)
LYMPHOCYTES NFR BLD AUTO: 23.2 % (ref 19.6–45.3)
MCH RBC QN AUTO: 30.3 PG (ref 26.6–33)
MCHC RBC AUTO-ENTMCNC: 33.5 G/DL (ref 31.5–35.7)
MCV RBC AUTO: 90.5 FL (ref 79–97)
MONOCYTES # BLD AUTO: 0.34 10*3/MM3 (ref 0.1–0.9)
MONOCYTES NFR BLD AUTO: 9.2 % (ref 5–12)
NEUTROPHILS NFR BLD AUTO: 2.46 10*3/MM3 (ref 1.7–7)
NEUTROPHILS NFR BLD AUTO: 66.5 % (ref 42.7–76)
NRBC BLD AUTO-RTO: 0 /100 WBC (ref 0–0.2)
PLATELET # BLD AUTO: 313 10*3/MM3 (ref 140–450)
PMV BLD AUTO: 10.8 FL (ref 6–12)
POTASSIUM SERPL-SCNC: 4.5 MMOL/L (ref 3.5–5.2)
RBC # BLD AUTO: 4.02 10*6/MM3 (ref 3.77–5.28)
SODIUM SERPL-SCNC: 138 MMOL/L (ref 136–145)
WBC NRBC COR # BLD: 3.7 10*3/MM3 (ref 3.4–10.8)

## 2022-07-25 ENCOUNTER — NURSE NAVIGATOR (OUTPATIENT)
Dept: ONCOLOGY | Facility: CLINIC | Age: 52
End: 2022-07-25

## 2022-07-26 ENCOUNTER — NURSE NAVIGATOR (OUTPATIENT)
Dept: ONCOLOGY | Facility: CLINIC | Age: 52
End: 2022-07-26

## 2022-07-26 NOTE — PROGRESS NOTES
Called patient to see how patient was feeling after testing for covid. Patient stated she was feeling much better. NN spent time talking to patient, and asked about surgery. Patient did not give me a date that she wanted to have surgery, voiced that she did want to have the surgery, but would call back at a later time and rushed off the phone. NN will follow up.

## 2022-07-26 NOTE — PROGRESS NOTES
Patients , Valdo, called on this date. Valdo stated patient had changed her mind again and did not want to have surgery. NN voiced understanding as this has been an ongoing process of patient being undecided about treatment. All options and risks of this disease/illness have been explained to patient and , and they both have stated they understand them.  NN encouraged them to still reach out to me if they have any questions of concerns.

## 2022-07-28 ENCOUNTER — TELEPHONE (OUTPATIENT)
Dept: SURGERY | Facility: CLINIC | Age: 52
End: 2022-07-28

## 2022-07-28 NOTE — TELEPHONE ENCOUNTER
Caller: DR.JESSICA YOST    Relationship to patient:  ONCOLOGIST    Best call back number: [phone]    Patient is needing: FAX PATH REPORT 5.9.22 TO ([phone]) WOULD LIKE TO HAVE BY MIDDLE OF NEXT WEEK. 8.2.22

## 2025-04-09 NOTE — PROGRESS NOTES
Subjective     Date: 4/16/2025    Referring Provider  Elaine Truong MD    Chief Complaint  Infiltrating ductal carcinoma of left breast     Subjective          Brayan Chan is a 54 y.o. female who presents today to [facility] for initial evaluation .    HPI:   54 y.o.female with past medical history of rheumatoid arthritis, Raynaud's syndrome, and localized breast cancer who presents for second opinion and to establish care.       Oncology History:   04/22/22: Mammogram and ultrasound showed spiculated left breast outer upper quadrant mass measuring 20 x 27 x 23 mm with architectural distortion and indeterminate microcalcifications in the anterior inferior thyroid. The mass corresponds to any area of palpable fullness in the left breast. The right breast has dense fibroglandular pattern with suspicious microcalcifications, architectural distortion, dominant mass skin thickening or nipple retraction. Ultrasound of the right breast shows no abnormal shadowing or suspicious masses. The right axillary lymph nodes have uniformly thickened cortex measuring 3 mm but preserved echogenic fatty hilum. The left breast and axillary ultrasound abnormal with pathologic left axillary lymph node having markedly thickened cortex measuring 7 mm, irregular contour and diminished echogenic fatty hilum this lymph node measures 18 x 15 x 13 mm. Within the left breast at 3 o'clock is a poorly defined solid irregular mass that is that is taller than wide and measures 27 x 20 x 21 mm.  05/06/22: Left breast core needle biopsy showed invasive ductal carcinoma, Darrow grade 3 (tubular score 3, nuclear score 2, mitotic score 3) measuring up to 3.5 mm, ER positive 100%, FL positive 100% and HER2 negative 0%.  5/9/22:  FNA left lymph node (not specified which lymph node but ordered by Dr. Columba Lopez)--positive for metastatic carcinoma  05/18/22: MRI of bilateral breasts: Negative right breast  MRI. Correlating to the biopsy-proven malignancy in the left 3 o'clock region is an approximately 4.2 cm irregular rapidly enhancing mass. There are no additional areas of contrast enhancement in the left breast. Several enlarged left axillary lymph nodes are noted.  05/23/22: CT CAP showed slightly prominent lymph nodes in both axillary regions measuring up to 16 mm, more numerous on the left. Mildly enlarged homogeneously enhancing uterus suggesting leiomyomas. Bone scan did not show any evidence of metastases. Degenerative uptake noted of the axial and appendicular skeleton.   07/18/22: Seen by Dr. Lopez (surgeon) for follow up, was previously recommended to have neoadjuvant chemotherapy followed by surgery. Patient was hesitant to proceed with chemotherapy and and was offered a surgery but patient was still hesitant.  07/28/22: Initial medical oncology consult with Dr. Truong at [facility]  2/16/23: CT chest - Mild interval decrease in size left breast lesion in comparison to the previous study which could be due to true reduction in size versus difference in technique. Stable to slightly worsened left axillary lymphadenopathy. Pre-existing T1 sclerotic lesion.  2/16/23: Bone Scan - no evidence of bony metastatic disease.    2/16/23: L Breast and Axillar U/S - demonstrates L axillary lymph nodes that are enlarged and may be slightly larger than last visualization (4 total lymph node identified) and L breast remains unchanged, or slightly larger from last visualization.   As of 2/15/2024, patient previously stated that she does not want any surgery, chemotherapy, or radiation to treat her breast cancer.  She does not want any biopsies of her breast or lymph nodes.She is agreeable to endocrine therapy, was on anastrazole until 9/2023; stopped and was prescribed exemestane. However she never started exemestane and didn't take AI from 9/2023 to Feb 2024 because of anxiety.      GYN History:  Menarche at age 12. G3, P2, 1  "miscarriage.  Age of first pregnancy: 18  Age of menopause: 50  Breast feed: yes  Birth control: yes, briefly  Hormone replacement: no    Ms. Chan presents today with her , Valdo. She reports taking AI briefly but experienced significant side effects. When asked further, she reports impacting \"sex life and joint pains\" which she attributes to AI, discontinued this. She was asked to do further staging studies at [facility], but due to long distance to [place], would like to establish care with us. She again, is unsure if she would like to have surgery or chemotherapy. Does not take anything for autoimmune disease. Has taken many herbal supplements and teas over the years to treat breast cancer, unable to recall names. Currently taking methylene blue and fenbendazole for the last 4 months.    Denies smoking, alcohol use, drug use. Does not work, but very active and frequents the gym. Denies family history of malignancy.        Objective     Objective     Allergy:   No Known Allergies     Current Medications:   Current Outpatient Medications   Medication Sig Dispense Refill    Garlic 3 MG capsule Take 2 mg by mouth 3 times a day.      Turmeric (QC Tumeric Complex) 500 MG capsule Take 2 mg by mouth 2 (Two) Times a Day.       No current facility-administered medications for this visit.       Past Medical History:  Past Medical History:   Diagnosis Date    Anemia     Arthritis     Arthritis     Lupus     PONV (postoperative nausea and vomiting)        Past Surgical History:  Past Surgical History:   Procedure Laterality Date    TONSILLECTOMY      TONSILLECTOMY         Social History:  Social History     Socioeconomic History    Marital status:    Tobacco Use    Smoking status: Never    Smokeless tobacco: Never   Vaping Use    Vaping status: Never Used   Substance and Sexual Activity    Alcohol use: Never    Drug use: Never    Sexual activity: Defer         Family History:  Family History   Problem Relation " Age of Onset    Hyperlipidemia Mother        Review of Systems:  Review of Systems   Musculoskeletal:  Positive for arthralgias.   All other systems reviewed and are negative.      Vital Signs:   /74   Pulse 77   Temp 97.3 °F (36.3 °C) (Temporal)   Resp 18   Wt 83 kg (183 lb)   SpO2 100%   BMI 33.47 kg/m²      Physical Exam:  Physical Exam  Vitals reviewed. Exam conducted with a chaperone present (Liset Bhandari).   Constitutional:       General: She is not in acute distress.     Appearance: Normal appearance. She is not ill-appearing.   HENT:      Head: Normocephalic and atraumatic.      Mouth/Throat:      Mouth: Mucous membranes are moist.      Pharynx: Oropharynx is clear.   Eyes:      Conjunctiva/sclera: Conjunctivae normal.      Pupils: Pupils are equal, round, and reactive to light.   Cardiovascular:      Rate and Rhythm: Normal rate and regular rhythm.      Heart sounds: No murmur heard.  Pulmonary:      Effort: Pulmonary effort is normal. No respiratory distress.      Breath sounds: Normal breath sounds. No wheezing.   Chest:   Breasts:     Left: Mass present.      Comments: 4-4.5 cm hard mass at 4 o'clock   Abdominal:      General: Abdomen is flat. Bowel sounds are normal. There is no distension.      Palpations: Abdomen is soft. There is no mass.      Tenderness: There is no abdominal tenderness. There is no guarding.   Musculoskeletal:         General: No swelling. Normal range of motion.      Cervical back: Normal range of motion.   Lymphadenopathy:      Cervical: No cervical adenopathy.   Skin:     General: Skin is warm and dry.   Neurological:      General: No focal deficit present.      Mental Status: She is alert and oriented to person, place, and time. Mental status is at baseline.   Psychiatric:         Mood and Affect: Mood normal.         PHQ-9 Score  PHQ-9 Total Score:       Pain Score  Vitals:    04/15/25 1351   BP: 112/74   Pulse: 77   Resp: 18   Temp: 97.3 °F (36.3 °C)   TempSrc:  Temporal   SpO2: 100%   Weight: 83 kg (183 lb)   PainSc: 0-No pain                       Lab Review  Lab Results   Component Value Date    WBC 5.22 04/15/2025    HGB 12.7 04/15/2025    HCT 39.1 04/15/2025    MCV 90.9 04/15/2025    RDW 12.9 04/15/2025     04/15/2025    NEUTRORELPCT 59.3 04/15/2025    LYMPHORELPCT 30.3 04/15/2025    MONORELPCT 7.9 04/15/2025    EOSRELPCT 2.1 04/15/2025    BASORELPCT 0.2 04/15/2025    NEUTROABS 3.10 04/15/2025    LYMPHSABS 1.58 04/15/2025     Lab Results   Component Value Date     04/15/2025    K 4.3 04/15/2025    CO2 26.5 04/15/2025     04/15/2025    BUN 15 04/15/2025    CREATININE 0.59 04/15/2025    EGFRIFNONA 112 02/15/2018    EGFRIFAFRI >120 02/15/2018    GLUCOSE 93 04/15/2025    CALCIUM 9.6 04/15/2025    ALKPHOS 79 04/15/2025    AST 17 04/15/2025    ALT 8 04/15/2025    BILITOT 0.2 04/15/2025    ALBUMIN 4.4 04/15/2025    PROTEINTOT 8.1 04/15/2025    MG 2.2 (H) 04/26/2023       Radiology Results  US Breast Left Limited (01/23/2025 13:45)   FINDINGS:  Interval increase in size of biopsy-proven malignant spiculated and microlobulated mass left breast, which mammographically measures 40 x 30 x 38 mm in the 3:00 position, spanning from T2 to 6 centimeters from the nipple. There is associated nipple tethering with spicules extending to the subareolar region. Biopsy clip is noted in the superior lateral aspect of this mass, biopsy-proven invasive ductal carcinoma BI-RADS 6.   A targeted left breast ultrasound reveals interval increase in size of the irregular heterogeneous mass with angular and spiculated borders with internal vascularity extending and abutting the nipple which measures 39 x 26 by by 32 mm correlating to mammogram. This has significantly increased in size when compared to most recent mammogram from 3/17/2023 at which time it measured approximately 24 x 14 x 20 mm. It has also also increased in size when compared to ultrasound performed 2/16/2023 at  which time it measured 20 x 16 x 22 mm.   There is a stable 13 mm mass at the 10:00 position 4 cm from the nipple with oval circumscribed margins. Sonographic evaluation of this mass demonstrates a bilobed lobular hypoechoic parallel mass at the 4:00 position 10 cm from nipple measuring 13 x 4 x 6 mm, unchanged from prior likely reflecting a benign mass, mammographically and sonographically stable since 3/17/2023     Left axilla and left axillary tail lymph nodes: Additional sonographic evaluation was performed of the left axilla revealing multiple abnormal axillary lymph nodes, the most worrisome are lymph nodes   1 and 2. Moderately concerning for metastatic regional axillary disease are also lymph nodes 3 and 4 as well as 3 axillary tail lymph nodes labeled at the 2:00 position     Left Breast BI-RADS Code: BI-RADS 4. Suspicious finding.   Left breast BI-RADS code: BI-RADS 6, biopsy-proven malignancy for finding 1.     IMPRESSION:   Left Breast Recommendations: Fine Needle Aspiration F axillary lymph node one or 2 if it would change patient's clinical or surgical management management     US Breast Right Limited (01/23/2025 13:45)   FINDINGS:   Right breast: No suspicious masses, microcalcifications or distortion in the right breast.   Right axilla: There are multiple right axillary lymph nodes, with borderline enlarged cortical thickening between 3 to 4 mm, the most prominent is right axillary lymph node #3 with cortical thickening up to 5 mm. This was not previously been sampled as recommended.     RECOMMENDATIONS:   Right Breast Recommendations: Fine Needle Aspiration of right axillary lymph node #3 as previously recommended if it changes patient's clinical or surgical management     IMPRESSION:   Right Breast BI-RADS Code:  BI-RADS 4. Suspicious finding.     Mammo Diagnostic Digital Tomosynthesis Bilateral With CAD (01/23/2025 11:47)   FINDINGS:   Right Breast BI-RADS Code:  BI-RADS 4. Suspicious finding.    Left Breast BI-RADS Code: BI-RADS 4. Suspicious finding.   Left breast BI-RADS code: BI-RADS 6, biopsy-proven malignancy for finding 1.     RECOMMENDATIONS:   Right Breast Recommendations: Fine Needle Aspiration of right axillary lymph node #3 as previously recommended if it changes patient's clinical or surgical management   Left Breast Recommendations: Fine Needle Aspiration F axillary lymph node one or 2 if it would change patient's clinical or surgical management management     Bilateral Mammogram Findings, Left Ultrasound Findings and Bilateral Axillary Sonographic Findings:     Bilateral Breast Density: The breasts are heterogeneously dense, which may obscure small masses.     Right breast: No suspicious masses, microcalcifications or distortion in the right breast.     Left breast:   Finding 1: Interval increase in size of biopsy-proven malignant spiculated and microlobulated mass left breast, which mammographically measures 40 x 30 x 38 mm in the 3:00 position, spanning from T2 to 6 centimeters from the nipple. There is associated nipple tethering with spicules extending to the subareolar region. Biopsy clip is noted in the superior lateral aspect of this mass, biopsy-proven invasive ductal carcinoma BI-RADS 6.     A targeted left breast ultrasound reveals interval increase in size of the irregular heterogeneous mass with angular and spiculated borders with internal vascularity extending and abutting the nipple which measures 39 x 26 by by 32 mm correlating to mammogram. This has significantly increased in size when compared to most recent mammogram from 3/17/2023 at which time it measured approximately 24 x 14 x 20 mm. It has also also increased in size when compared to ultrasound performed 2/16/2023 at which time it measured 20 x 16 x 22 mm.     Finding 2: There is a stable 13 mm mass at the 10:00 position 4 cm from the nipple with oval circumscribed margins. Sonographic evaluation of this mass  demonstrates a bilobed lobular hypoechoic parallel mass at the 4:00 position 10 cm from nipple measuring 13 x 4 x 6 mm, unchanged from prior likely reflecting a benign mass, mammographically and sonographically stable since 3/17/2023.     Bilateral axillary ultrasound was performed in additional evaluation of the prior suspicious bilateral axillary lymph nodes.     Right axilla: There are multiple right axillary lymph nodes, with borderline enlarged cortical thickening between 3 to 4 mm, the most prominent is right axillary lymph node #3 with cortical thickening up to 5 mm. This was not previously been sampled as recommended.     Left axilla and left axillary tail lymph nodes: Additional sonographic evaluation was performed of the left axilla revealing multiple abnormal axillary lymph nodes, the most worrisome are lymph nodes   1 and 2. Moderately concerning for metastatic regional axillary disease are also lymph nodes 3 and 4 as well as 3 axillary tail lymph nodes labeled at the 2:00 position     NM BONE SCAN WHOLE BODY (09/22/2023 13:32)   FINDINGS:   Bones: No suspicious focal sites of increased or decreased radiotracer uptake suggestive of bone metastases.   No new sites of focal increased decreased radiotracer uptake.   Bones/Joints: Focal increased uptake involving shoulders, acromioclavicular joints, sternoclavicular joints, elbows, wrists and hands, thoracic spine, lumbosacral spine, sacroiliac joints, hips (left greater than right), knees (right greater than left), ankles and feet (left greater than right), consistent with benign/arthritic/degenerative/post-traumatic changes.   Soft-tissues: Normal. Two kidneys visualized.     IMPRESSION:   No bone metastasis     CT CHEST W CONTRAST DIAGNOSTIC (09/22/2023 11:26)   FINDINGS:   Mediastinum and Pleura: No mediastinal or hilar adenopathy. No pleural or pericardial effusion.   Lungs: No suspicious pulmonary nodules.   Abdomen and Pelvis: No suspicious solid  organ lesions. No abdominal or pelvic adenopathy. Normal appearance of the pelvic viscera. No focal GI tract abnormalities.   Musculoskeletal: No suspicious lytic or sclerotic bone lesions. T1 bone lesion is likely a benign bone island. Similar appearance of enhancing nodule in the left breast, adjacent to the biopsy clip. Multiple bilateral axillary lymph nodes, most of which are not enlarged by CT size criteria. The largest lymph node in the left axilla measures 14 mm short axis versus 12. Other left axillary lymph nodes appear slightly decreased.     IMPRESSION:   1. No evidence of disease progression.   2. Stable left breast lesion and roughly stable bilateral axillary lymph nodes.     CT ABDOMEN PELVIS W CONTRAST (09/22/2023 11:26)   FINDINGS:   Mediastinum and Pleura: No mediastinal or hilar adenopathy. No pleural or pericardial effusion.   Lungs: No suspicious pulmonary nodules.   Abdomen and Pelvis: No suspicious solid organ lesions. No abdominal or pelvic adenopathy. Normal appearance of the pelvic viscera. No focal GI tract abnormalities.   Musculoskeletal: No suspicious lytic or sclerotic bone lesions. T1 bone lesion is likely a benign bone island. Similar appearance of enhancing nodule in the left breast, adjacent to the biopsy clip. Multiple bilateral axillary lymph nodes, most of which are not enlarged by CT size criteria. The largest lymph node in the left axilla measures 14 mm short axis versus 12. Other left axillary lymph nodes appear slightly decreased.     IMPRESSION:   1. No evidence of disease progression.   2. Stable left breast lesion and roughly stable bilateral axillary lymph nodes.     DEXA BONE DENSITY AXIAL (09/22/2023 09:02)   FINDINGS:   Based on WHO criteria (post-menopausal female) the diagnosis is Osteopenia       The lowest T- score is -1.1 in the LFN   FRAX (10-year probability of fracture) - Major Osteoporotic:  6.6 %;  Hip:    0.4 %     TREATMENT RECOMMENDATIONS:    Measured  bone density does not cross threshold for treatment   Work up for secondary osteoporosis and metabolic bone disease could be   considered based on clinical indications.   Suggest general measures to optimize calcium and vitamin D status, fall   prevention measures and reduce fracture risk.   Consider repeating this study in 2 year(s) or as clinically indicated to   assess bone density change or response to treatment (should be performed   on the same DXA scanner to allow for direct comparison and calculation of   change in BMD).     US BREAST LEFT LIMITED (03/17/2023 13:30)   Left Breast BI-RADS Code:    BI-RADS 6, Biopsy proven malignancy for finding 1   BI-RADS 3, probably benign lesion for finding 2     Left Breast Recommendations: Diagnostic Mammogram in 6 Months  Breast Ultrasound 6 Months for follow-up of finding 2. Medical/Surgical follow up for known malignancy.     Left breast:   Finding 1: Interval decrease in size of irregular mass with associated architectural distortion and tissue marker in the lateral left breast. This corresponds to biopsy-proven malignancy. The mass measures approximately 25 mm, previously 34 mm mammographically in April 2022. However, this mammographic measurement is similar to sonographic measurement from most recent ultrasound February 2023.     Finding 2: There is a 9 mm oval, circumscribed mass in the upper inner left breast, 4 cm from the nipple. Finding appears stable since 2022. Ultrasound was performed for further evaluation.     US BREAST RIGHT LIMITED (03/17/2023 13:29)   Right breast:   Finding 1: No mammographic correlate for palpable concern in the right axilla. Ultrasound was performed for further evaluation.   No suspicious masses, calcifications, or architectural distortion.     Focused ultrasound was performed in the region of palpable concern in the right axilla. Ultrasound demonstrates several lymph nodes with mild cortical thickening. The most concerning node is  labeled as #3.     MAMMO DIAGNOSTIC DIGITAL TOMOSYNTHESIS BILATERAL W CAD (03/17/2023 11:49)   Mammogram Findings:   Right breast:   Finding 1: No mammographic correlate for palpable concern in the right axilla. Ultrasound was performed for further evaluation.   No suspicious masses, calcifications, or architectural distortion.     Left breast:   Finding 1: Interval decrease in size of irregular mass with associated architectural distortion and tissue marker in the lateral left breast. This corresponds to biopsy-proven malignancy. The mass measures approximately 25 mm, previously 34 mm mammographically in April 2022. However, this mammographic measurement is similar to sonographic measurement from most recent ultrasound February 2023.   Finding 2: There is a 9 mm oval, circumscribed mass in the upper inner left breast, 4 cm from the nipple. Finding appears stable since 2022. Ultrasound was performed for further evaluation.     US BREAST LEFT LIMITED (02/16/2023 14:57)   FINDINGS:   Finding 1: Follow-up ultrasound was performed at 3:00, 7 cm from the nipple. Overall stable size of irregular hypoechoic mass with angular margins measuring 2.4 x 2.0 x 1.6 cm. This corresponds to biopsy-proven malignancy.   Finding 2:  Follow-up ultrasound was performed of the left axilla. Ultrasound redemonstrates 4 morphologically abnormal-appearing lymph nodes with cortical thickening which are similar in size compared to prior.     IMPRESSION:   BI-RADS: BI-RADS 6, Biopsy proven malignancy   RECOMMENDATIONS: Medical/Surgical follow up     NM BONE SCAN WHOLE BODY (02/16/2023 13:09)   FINDINGS:   Bones: No sites of focal increased (hot) or decreased (cold) uptake suggestive of bone metastasis(es). No new sites of increased (hot) or decreased (cold) uptake suggestive of new bone metastases. Specifically, known sclerotic lesion in T1 vertebral body not metabolically active by planar imaging.   Bones/Joints: Focal moderately intense  increased uptake involving maxilla(s) and/or mandible(s), shoulders, acromioclavicular joints, sternoclavicular joints, elbows, wrists and hands, thoracic spine, lumbosacral spine, sacroiliac joints, hips (left greater than right), knees (right greater than left), ankles (left greater than right) and feet, consistent with benign/arthritic/degenerative/post-traumatic changes.   Soft-tissues: Normal. Two kidneys visualized.     IMPRESSION:   No bone metastasis.   Compared to Previous: No significant change     US BREAST LEFT LIMITED (09/20/2022 15:13)   FINDINGS:  Finding 1: Follow-up ultrasound was performed at 3:00, 7 cm from the nipple. Overall stable size of irregular hypoechoic mass with angular margins measuring 2.4 x 2.3 x 1.3 cm, previously 2.4 x 2.0 x 1.4 cm.   Finding 2:  Follow-up ultrasound was performed of the left axilla. Ultrasound redemonstrates 4 morphologically abnormal-appearing lymph nodes with cortical thickening. Lymph nodes #3 and 4 slightly smaller. Remainder similar in size compared to prior     IMPRESSION:   BI-RADS: BI-RADS 6, Biopsy proven malignancy   RECOMMENDATIONS: Medical/Surgical follow up     US BREAST LEFT LIMITED (08/05/2022 13:56)   FINDINGS:  Finding 1: Focused ultrasound was performed at 3:00, 7 cm from the nipple. Overall stable size of irregular hypoechoic mass with angular margins measuring 2.4 x 2.0 x 1.4 cm, previously measured as 2.6 x 2.0 cm.     Finding 2: Focused ultrasound was performed of the left axilla. Ultrasound demonstrates 4 morphologically abnormal-appearing lymph nodes. This includes stable lymph node with diffuse cortical thickening measuring 16 mm (#1). There are additional abnormal lymph nodes with diffuse cortical thickening '    IMPRESSION:   BI-RADS: BI-RADS 6, Biopsy proven malignancy   RECOMMENDATIONS: Medical/Surgical follow up     MRI Breast Bilateral With & Without Contrast (05/17/2022 15:38)   FINDINGS:   There is minimal bilateral background  contrast enhancement and  normal vascular enhancement.  There are no worrisome areas of contrast enhancement in the right  breast.  Correlating to the biopsy-proven malignancy in the left 3:00 region is  an approximately 4.2 cm irregular rapidly enhancing mass. There are no  additional worrisome areas of contrast enhancement in the left breast.  Several enlarged left axillary lymph nodes are noted. No chest wall  abnormality is seen.     IMPRESSION:  1. Negative right breast MRI.  2. Biopsy-proven malignancy in the left 3:00 region measuring 4.2 cm  associated with left axillary lymphadenopathy. There are no additional  worrisome findings in the left breast.     RECOMMENDATIONS: Recommend surgical follow-up.     BI-RADS CATEGORY: 6, KNOWN BIOPSY PROVEN MALIGNANCY        Pathology:   05/09/2022 05/06/2022                Endoscopy:     Assessment / Plan         Assessment and Plan   Brayan Chan is a 54 y.o. presents for     (L) breast invasive ductal carcinoma    Cancer Staging   Stage IIB (cT2, cN1(f), cM0, G3, ER+, MA+, HER2-)  -Diagnosed with screening mammogram in 2022, established care with general surgery, Dr. Lopez. Patient was recommended to have neoadjuvant chemotherapy, which she declined. Was offered surgery alone, which she declined. Has established care with Elaine Truong MD, medical oncologist at [facility]. Attempted AI briefly, which she discontinued due to attributed side effects. Has been taking herbal supplements and teas (unsure of names), currently taking Fenbendazole and methylene blue for the last 3-4 months.   -I am to now assume her medical care. 4/15/2025.   -Most recent mammogram 1/23/2025: L breast with interval increase measures 40 x 30 x 38 mm in 3:00 position, 6 cm from nipple. Associated tethering with spicules extending to subareolar region. Left axilla and axillary tail reveal multiple abnormal axillary lymph nodes, moderately concerning for metastatic regional axillary disease as  well as 3 axillary tail lymph nodes. Further staging studies were ordered, which patient had not done at [facility]  -Order CT chest, abdomen, pelvis and NM bone scan     2. Malignancy associated pain  - Currently denies         Discussed possible differential diagnoses, testing, treatment, recommended non-pharmacological interventions, risks, warning signs to monitor for that would indicate need for follow-up in clinic or ER. If no improvement with these regimens or you have new or worsening symptoms follow-up. Patient verbalizes understanding and agreement with plan of care. Denies further needs or concerns.     Patient was given instructions and counseling regarding her condition and for health maintenance advised.       All questions were answered to her satisfaction.    BMI cannot be calculated due to outdated height or weight values.  Please input a current height/weight in Vitals and re-renter BMIFOLLOWUP in Note to pull in correct documentation based on BMI range.     ACO / MARK/Other  Quality measures  -  Brayan Chan did not receive 2024 flu vaccine.  This was recommended.  -  Brayan Chan reports a pain score of 0.  Given her pain assessment as noted, treatment options were discussed and the following options were decided upon as a follow-up plan to address the patient's pain: continuation of current treatment plan for pain.  -  Current outpatient and discharge medications have been reconciled for the patient.  Reviewed by: Esther Reyes MD        Meds ordered during this visit  No orders of the defined types were placed in this encounter.        Visit Diagnoses    ICD-10-CM ICD-9-CM   1. Malignant neoplasm of overlapping sites of left breast in female, estrogen receptor positive  C50.812 174.8    Z17.0 V86.0       Follow Up   In 3 weeks to review the above staging studies         This document has been electronically signed by Esther Reyes MD   April 16, 2025 09:11 EDT    Dictated Utilizing Dragon  Dictation: Part of this note may be an electronic transcription/translation of spoken language to printed text using the Dragon Dictation System.

## 2025-04-15 ENCOUNTER — LAB (OUTPATIENT)
Dept: ONCOLOGY | Facility: CLINIC | Age: 55
End: 2025-04-15
Payer: COMMERCIAL

## 2025-04-15 ENCOUNTER — CONSULT (OUTPATIENT)
Dept: ONCOLOGY | Facility: CLINIC | Age: 55
End: 2025-04-15
Payer: COMMERCIAL

## 2025-04-15 ENCOUNTER — PATIENT OUTREACH (OUTPATIENT)
Dept: ONCOLOGY | Facility: CLINIC | Age: 55
End: 2025-04-15
Payer: COMMERCIAL

## 2025-04-15 VITALS
TEMPERATURE: 97.3 F | BODY MASS INDEX: 33.47 KG/M2 | OXYGEN SATURATION: 100 % | RESPIRATION RATE: 18 BRPM | HEART RATE: 77 BPM | SYSTOLIC BLOOD PRESSURE: 112 MMHG | WEIGHT: 183 LBS | DIASTOLIC BLOOD PRESSURE: 74 MMHG

## 2025-04-15 DIAGNOSIS — C50.812 MALIGNANT NEOPLASM OF OVERLAPPING SITES OF LEFT BREAST IN FEMALE, ESTROGEN RECEPTOR POSITIVE: Primary | ICD-10-CM

## 2025-04-15 DIAGNOSIS — Z17.0 MALIGNANT NEOPLASM OF OVERLAPPING SITES OF LEFT BREAST IN FEMALE, ESTROGEN RECEPTOR POSITIVE: Primary | ICD-10-CM

## 2025-04-15 DIAGNOSIS — R92.8 ABNORMAL MAMMOGRAM: ICD-10-CM

## 2025-04-15 DIAGNOSIS — C50.812 MALIGNANT NEOPLASM OF OVERLAPPING SITES OF LEFT FEMALE BREAST, UNSPECIFIED ESTROGEN RECEPTOR STATUS: Primary | ICD-10-CM

## 2025-04-15 LAB
ALBUMIN SERPL-MCNC: 4.4 G/DL (ref 3.5–5.2)
ALBUMIN/GLOB SERPL: 1.2 G/DL
ALP SERPL-CCNC: 79 U/L (ref 39–117)
ALT SERPL W P-5'-P-CCNC: 8 U/L (ref 1–33)
ANION GAP SERPL CALCULATED.3IONS-SCNC: 8.5 MMOL/L (ref 5–15)
AST SERPL-CCNC: 17 U/L (ref 1–32)
BASOPHILS # BLD AUTO: 0.01 10*3/MM3 (ref 0–0.2)
BASOPHILS NFR BLD AUTO: 0.2 % (ref 0–1.5)
BILIRUB SERPL-MCNC: 0.2 MG/DL (ref 0–1.2)
BUN SERPL-MCNC: 15 MG/DL (ref 6–20)
BUN/CREAT SERPL: 25.4 (ref 7–25)
CALCIUM SPEC-SCNC: 9.6 MG/DL (ref 8.6–10.5)
CHLORIDE SERPL-SCNC: 102 MMOL/L (ref 98–107)
CO2 SERPL-SCNC: 26.5 MMOL/L (ref 22–29)
CREAT SERPL-MCNC: 0.59 MG/DL (ref 0.57–1)
DEPRECATED RDW RBC AUTO: 42.4 FL (ref 37–54)
EGFRCR SERPLBLD CKD-EPI 2021: 107.3 ML/MIN/1.73
EOSINOPHIL # BLD AUTO: 0.11 10*3/MM3 (ref 0–0.4)
EOSINOPHIL NFR BLD AUTO: 2.1 % (ref 0.3–6.2)
ERYTHROCYTE [DISTWIDTH] IN BLOOD BY AUTOMATED COUNT: 12.9 % (ref 12.3–15.4)
GLOBULIN UR ELPH-MCNC: 3.7 GM/DL
GLUCOSE SERPL-MCNC: 93 MG/DL (ref 65–99)
HCT VFR BLD AUTO: 39.1 % (ref 34–46.6)
HGB BLD-MCNC: 12.7 G/DL (ref 12–15.9)
IMM GRANULOCYTES # BLD AUTO: 0.01 10*3/MM3 (ref 0–0.05)
IMM GRANULOCYTES NFR BLD AUTO: 0.2 % (ref 0–0.5)
LYMPHOCYTES # BLD AUTO: 1.58 10*3/MM3 (ref 0.7–3.1)
LYMPHOCYTES NFR BLD AUTO: 30.3 % (ref 19.6–45.3)
MCH RBC QN AUTO: 29.5 PG (ref 26.6–33)
MCHC RBC AUTO-ENTMCNC: 32.5 G/DL (ref 31.5–35.7)
MCV RBC AUTO: 90.9 FL (ref 79–97)
MONOCYTES # BLD AUTO: 0.41 10*3/MM3 (ref 0.1–0.9)
MONOCYTES NFR BLD AUTO: 7.9 % (ref 5–12)
NEUTROPHILS NFR BLD AUTO: 3.1 10*3/MM3 (ref 1.7–7)
NEUTROPHILS NFR BLD AUTO: 59.3 % (ref 42.7–76)
NRBC BLD AUTO-RTO: 0 /100 WBC (ref 0–0.2)
PLATELET # BLD AUTO: 377 10*3/MM3 (ref 140–450)
PMV BLD AUTO: 8.7 FL (ref 6–12)
POTASSIUM SERPL-SCNC: 4.3 MMOL/L (ref 3.5–5.2)
PROT SERPL-MCNC: 8.1 G/DL (ref 6–8.5)
RBC # BLD AUTO: 4.3 10*6/MM3 (ref 3.77–5.28)
SODIUM SERPL-SCNC: 137 MMOL/L (ref 136–145)
WBC NRBC COR # BLD AUTO: 5.22 10*3/MM3 (ref 3.4–10.8)

## 2025-04-15 PROCEDURE — 36415 COLL VENOUS BLD VENIPUNCTURE: CPT | Performed by: INTERNAL MEDICINE

## 2025-04-15 PROCEDURE — 86300 IMMUNOASSAY TUMOR CA 15-3: CPT | Performed by: INTERNAL MEDICINE

## 2025-04-15 PROCEDURE — 80053 COMPREHEN METABOLIC PANEL: CPT | Performed by: INTERNAL MEDICINE

## 2025-04-15 PROCEDURE — 85025 COMPLETE CBC W/AUTO DIFF WBC: CPT | Performed by: INTERNAL MEDICINE

## 2025-04-15 RX ORDER — VIT C/B6/B5/MAGNESIUM/HERB 173 50-5-6-5MG
2 CAPSULE ORAL 2 TIMES DAILY
COMMUNITY

## 2025-04-15 NOTE — SIGNIFICANT NOTE
Met with patient and family on this date during a new consult with Dr. Reyes. NN contact information was provided and encouraged patient to call with any questions or concerns. Pt. Verbalized understanding and is in agreement with plan of care. NN will follow and assist PRN.

## 2025-04-15 NOTE — PROGRESS NOTES
Venipuncture Blood Specimen Collection  Venipuncture performed in Right arm by Chelsey Mcdaniels MA with good hemostasis. Patient tolerated the procedure well without complications.   04/15/25   Chelsey Mcdaniels MA

## 2025-04-16 LAB
CANCER AG15-3 SERPL-ACNC: 18.5 U/ML
CANCER AG27-29 SERPL-ACNC: 20.1 U/ML (ref 0–38.6)

## 2025-04-23 ENCOUNTER — HOSPITAL ENCOUNTER (OUTPATIENT)
Dept: NUCLEAR MEDICINE | Facility: HOSPITAL | Age: 55
Discharge: HOME | End: 2025-04-23
Payer: COMMERCIAL

## 2025-04-23 DIAGNOSIS — C50.812 MALIGNANT NEOPLASM OF OVERLAPPING SITES OF LEFT BREAST IN FEMALE, ESTROGEN RECEPTOR POSITIVE: ICD-10-CM

## 2025-04-23 DIAGNOSIS — Z17.0 MALIGNANT NEOPLASM OF OVERLAPPING SITES OF LEFT BREAST IN FEMALE, ESTROGEN RECEPTOR POSITIVE: ICD-10-CM

## 2025-04-23 PROCEDURE — A9561 TC99M OXIDRONATE: HCPCS | Performed by: INTERNAL MEDICINE

## 2025-04-23 PROCEDURE — 78306 BONE IMAGING WHOLE BODY: CPT

## 2025-04-23 PROCEDURE — 34310000005 TECHNETIUM OXIDRONATE KIT: Performed by: INTERNAL MEDICINE

## 2025-04-23 PROCEDURE — 78306 BONE IMAGING WHOLE BODY: CPT | Performed by: RADIOLOGY

## 2025-04-23 RX ADMIN — TECHNETIUM TC 99M OXIDRONATE 1 DOSE: 3.15 INJECTION, POWDER, LYOPHILIZED, FOR SOLUTION INTRAVENOUS at 11:04

## 2025-05-01 ENCOUNTER — HOSPITAL ENCOUNTER (OUTPATIENT)
Dept: CT IMAGING | Facility: HOSPITAL | Age: 55
Discharge: HOME | End: 2025-05-01
Payer: COMMERCIAL

## 2025-05-01 DIAGNOSIS — Z17.0 MALIGNANT NEOPLASM OF OVERLAPPING SITES OF LEFT BREAST IN FEMALE, ESTROGEN RECEPTOR POSITIVE: ICD-10-CM

## 2025-05-01 DIAGNOSIS — C50.812 MALIGNANT NEOPLASM OF OVERLAPPING SITES OF LEFT BREAST IN FEMALE, ESTROGEN RECEPTOR POSITIVE: ICD-10-CM

## 2025-05-01 PROCEDURE — 25510000001 IOPAMIDOL 61 % SOLUTION: Performed by: INTERNAL MEDICINE

## 2025-05-01 PROCEDURE — 71260 CT THORAX DX C+: CPT

## 2025-05-01 PROCEDURE — 71260 CT THORAX DX C+: CPT | Performed by: RADIOLOGY

## 2025-05-01 PROCEDURE — 74177 CT ABD & PELVIS W/CONTRAST: CPT | Performed by: RADIOLOGY

## 2025-05-01 PROCEDURE — 74177 CT ABD & PELVIS W/CONTRAST: CPT

## 2025-05-01 RX ORDER — IOPAMIDOL 612 MG/ML
100 INJECTION, SOLUTION INTRAVASCULAR
Status: COMPLETED | OUTPATIENT
Start: 2025-05-01 | End: 2025-05-01

## 2025-05-01 RX ADMIN — IOPAMIDOL 100 ML: 612 INJECTION, SOLUTION INTRAVENOUS at 08:39

## 2025-05-07 ENCOUNTER — OFFICE VISIT (OUTPATIENT)
Dept: ONCOLOGY | Facility: CLINIC | Age: 55
End: 2025-05-07
Payer: COMMERCIAL

## 2025-05-07 VITALS
TEMPERATURE: 98 F | RESPIRATION RATE: 20 BRPM | SYSTOLIC BLOOD PRESSURE: 123 MMHG | HEART RATE: 89 BPM | WEIGHT: 181.4 LBS | HEIGHT: 62 IN | BODY MASS INDEX: 33.38 KG/M2 | OXYGEN SATURATION: 98 % | DIASTOLIC BLOOD PRESSURE: 78 MMHG

## 2025-05-07 DIAGNOSIS — Z17.0 MALIGNANT NEOPLASM OF OVERLAPPING SITES OF LEFT BREAST IN FEMALE, ESTROGEN RECEPTOR POSITIVE: ICD-10-CM

## 2025-05-07 DIAGNOSIS — C50.812 MALIGNANT NEOPLASM OF OVERLAPPING SITES OF LEFT FEMALE BREAST, UNSPECIFIED ESTROGEN RECEPTOR STATUS: Primary | ICD-10-CM

## 2025-05-07 DIAGNOSIS — C50.812 MALIGNANT NEOPLASM OF OVERLAPPING SITES OF LEFT BREAST IN FEMALE, ESTROGEN RECEPTOR POSITIVE: ICD-10-CM

## 2025-05-07 NOTE — PROGRESS NOTES
Subjective     Date: 5/7/2025    Referring Provider  No ref. provider found    Chief Complaint  Infiltrating ductal carcinoma of left breast     Subjective          Brayan Chan is a 54 y.o. female who presents today to [facility] for follow up.    HPI:   54 y.o.female with past medical history of rheumatoid arthritis, Raynaud's syndrome, and localized breast cancer who presents for second opinion and to establish care.       Oncology History:   04/22/22: Mammogram and ultrasound showed spiculated left breast outer upper quadrant mass measuring 20 x 27 x 23 mm with architectural distortion and indeterminate microcalcifications in the anterior inferior thyroid. The mass corresponds to any area of palpable fullness in the left breast. The right breast has dense fibroglandular pattern with suspicious microcalcifications, architectural distortion, dominant mass skin thickening or nipple retraction. Ultrasound of the right breast shows no abnormal shadowing or suspicious masses. The right axillary lymph nodes have uniformly thickened cortex measuring 3 mm but preserved echogenic fatty hilum. The left breast and axillary ultrasound abnormal with pathologic left axillary lymph node having markedly thickened cortex measuring 7 mm, irregular contour and diminished echogenic fatty hilum this lymph node measures 18 x 15 x 13 mm. Within the left breast at 3 o'clock is a poorly defined solid irregular mass that is that is taller than wide and measures 27 x 20 x 21 mm.  05/06/22: Left breast core needle biopsy showed invasive ductal carcinoma, Mcia grade 3 (tubular score 3, nuclear score 2, mitotic score 3) measuring up to 3.5 mm, ER positive 100%, ME positive 100% and HER2 negative 0%.  5/9/22:  FNA left lymph node (not specified which lymph node but ordered by Dr. Columba Lopez)--positive for metastatic carcinoma  05/18/22: MRI of bilateral breasts: Negative right breast MRI.  Correlating to the biopsy-proven malignancy in the left 3 o'clock region is an approximately 4.2 cm irregular rapidly enhancing mass. There are no additional areas of contrast enhancement in the left breast. Several enlarged left axillary lymph nodes are noted.  05/23/22: CT CAP showed slightly prominent lymph nodes in both axillary regions measuring up to 16 mm, more numerous on the left. Mildly enlarged homogeneously enhancing uterus suggesting leiomyomas. Bone scan did not show any evidence of metastases. Degenerative uptake noted of the axial and appendicular skeleton.   07/18/22: Seen by Dr. Lopez (surgeon) for follow up, was previously recommended to have neoadjuvant chemotherapy followed by surgery. Patient was hesitant to proceed with chemotherapy and and was offered a surgery but patient was still hesitant.  07/28/22: Initial medical oncology consult with Dr. Truong at [facility]  2/16/23: CT chest - Mild interval decrease in size left breast lesion in comparison to the previous study which could be due to true reduction in size versus difference in technique. Stable to slightly worsened left axillary lymphadenopathy. Pre-existing T1 sclerotic lesion.  2/16/23: Bone Scan - no evidence of bony metastatic disease.    2/16/23: L Breast and Axillar U/S - demonstrates L axillary lymph nodes that are enlarged and may be slightly larger than last visualization (4 total lymph node identified) and L breast remains unchanged, or slightly larger from last visualization.   As of 2/15/2024, patient previously stated that she does not want any surgery, chemotherapy, or radiation to treat her breast cancer.  She does not want any biopsies of her breast or lymph nodes.She is agreeable to endocrine therapy, was on anastrazole until 9/2023; stopped and was prescribed exemestane. However she never started exemestane and didn't take AI from 9/2023 to Feb 2024 because of anxiety.    1/23/2025: Mammogram with interval increase in  "biopsy proven mass L breast, measures 40 x 30 x 38 mm. Associated nipple tethering with spicules extending to subareolar region. Also noted 13 mm mass at 10:00 position 4 cm from nipple with oval circumscribed margins. Evaluation of this mass demonstrates a bilobed lobular hypoechoic parallel mass at 4:00 position 10 cm from nipple measuring 13 x 4 x 6 mm, unchanged from prior likely reflecting a benign mass, stable since 3/17/2023. Bilateral axillary US with R axilla multiple right axillary lymph nodes, borderline enlarged cortical thickening 3-4 mm, most prominent is right axillary lymph node #3 with cortical thickening up to 5 mm. Left axilla and left axillary tail lymph nodes revealing multiple abnormal axillary lymph nodes, the most worrisome are lymph nodes 1 and 2. Moderately concerning for metastatic regional axillary disease are also lymph nodes 3 and 4 as well as 3 axillary tail lymph nodes labeled at the 2:00 position     GYN History:  Menarche at age 12. G3, P2, 1 miscarriage.  Age of first pregnancy: 18  Age of menopause: 50  Breast feed: yes  Birth control: yes, briefly  Hormone replacement: no    Ms. Chan presents today with her , Valdo. She reports taking AI briefly but experienced significant side effects. When asked further, she reports impacting \"sex life and joint pains\" which she attributes to AI, discontinued this. She was asked to do further staging studies at [facility], but due to long distance to [place], would like to establish care with us. She again, is unsure if she would like to have surgery or chemotherapy. Does not take anything for autoimmune disease. Has taken many herbal supplements and teas over the years to treat breast cancer, unable to recall names. Currently taking methylene blue and fenbendazole for the last 4 months.    Denies smoking, alcohol use, drug use. Does not work, but very active and frequents the gym. Denies family history of malignancy.    Interval History " "05/07/2025   Ms. Chan presents for f/u. She is in distress today because her  is on the vent at [facility], was noted to have high fevers thought to be due to pneumonia and HLH, now being treated for laurence mountain spotted fever and ehrlichiosis.   We reviewed her NM PET scan which shows focus abnormal increased tracer activity localizing to either of the right scapula or to a right posterior third and fourth rib, which is non specific. Osteoblastic metastasis not excluded. CT of the chest with stable bilateral axillary lymphadenopathy, left axillary 2.4 x 1.2 cm and right axillary 2.8 x 0.7 cm. Mass of left breast which extends to skin surface approximately 4.8 cm.     She believes her cancer has not spread due to devotion and prayers to God. Still refusing chemo and endocrine therapy or anything that \"makes her feel bad.\" She is willing to have surgery. Requesting referral to [facility] for further discussion as she has read about alternative tx on their website.       Objective     Objective     Allergy:   No Known Allergies     Current Medications:   Current Outpatient Medications   Medication Sig Dispense Refill    Garlic 3 MG capsule Take 2 mg by mouth 3 times a day.      Turmeric (QC Tumeric Complex) 500 MG capsule Take 2 mg by mouth 2 (Two) Times a Day.       No current facility-administered medications for this visit.       Past Medical History:  Past Medical History:   Diagnosis Date    Anemia     Arthritis     Arthritis     Lupus     PONV (postoperative nausea and vomiting)        Past Surgical History:  Past Surgical History:   Procedure Laterality Date    TONSILLECTOMY      TONSILLECTOMY         Social History:  Social History     Socioeconomic History    Marital status:    Tobacco Use    Smoking status: Never    Smokeless tobacco: Never   Vaping Use    Vaping status: Never Used   Substance and Sexual Activity    Alcohol use: Never    Drug use: Never    Sexual activity: Defer         Family " "History:  Family History   Problem Relation Age of Onset    Hyperlipidemia Mother        Review of Systems:  Review of Systems   Musculoskeletal:  Positive for arthralgias.   All other systems reviewed and are negative.      Vital Signs:   /78   Pulse 89   Temp 98 °F (36.7 °C) (Temporal)   Resp 20   Ht 157.5 cm (62\")   Wt 82.3 kg (181 lb 6.4 oz)   SpO2 98%   BMI 33.18 kg/m²      Physical Exam:  Physical Exam  Vitals reviewed. Exam conducted with a chaperone present (Liset Bhandari).   Constitutional:       General: She is not in acute distress.     Appearance: Normal appearance. She is not ill-appearing.   HENT:      Head: Normocephalic and atraumatic.      Mouth/Throat:      Mouth: Mucous membranes are moist.      Pharynx: Oropharynx is clear.   Eyes:      Conjunctiva/sclera: Conjunctivae normal.      Pupils: Pupils are equal, round, and reactive to light.   Cardiovascular:      Rate and Rhythm: Normal rate and regular rhythm.      Heart sounds: No murmur heard.  Pulmonary:      Effort: Pulmonary effort is normal. No respiratory distress.      Breath sounds: Normal breath sounds. No wheezing.   Chest:   Breasts:     Left: Mass present.      Comments: 4-4.5 cm hard mass at 4 o'clock   Abdominal:      General: Abdomen is flat. Bowel sounds are normal. There is no distension.      Palpations: Abdomen is soft. There is no mass.      Tenderness: There is no abdominal tenderness. There is no guarding.   Musculoskeletal:         General: No swelling. Normal range of motion.      Cervical back: Normal range of motion.   Lymphadenopathy:      Cervical: No cervical adenopathy.   Skin:     General: Skin is warm and dry.   Neurological:      General: No focal deficit present.      Mental Status: She is alert and oriented to person, place, and time. Mental status is at baseline.   Psychiatric:         Mood and Affect: Mood normal.         PHQ-9 Score  PHQ-9 Total Score:       Pain Score  Vitals:    05/07/25 1245 " "  BP: 123/78   Pulse: 89   Resp: 20   Temp: 98 °F (36.7 °C)   TempSrc: Temporal   SpO2: 98%   Weight: 82.3 kg (181 lb 6.4 oz)   Height: 157.5 cm (62\")   PainSc: 0-No pain         ECOG score: 0               Lab Review  Lab Results   Component Value Date    WBC 5.22 04/15/2025    HGB 12.7 04/15/2025    HCT 39.1 04/15/2025    MCV 90.9 04/15/2025    RDW 12.9 04/15/2025     04/15/2025    NEUTRORELPCT 59.3 04/15/2025    LYMPHORELPCT 30.3 04/15/2025    MONORELPCT 7.9 04/15/2025    EOSRELPCT 2.1 04/15/2025    BASORELPCT 0.2 04/15/2025    NEUTROABS 3.10 04/15/2025    LYMPHSABS 1.58 04/15/2025     Lab Results   Component Value Date     04/15/2025    K 4.3 04/15/2025    CO2 26.5 04/15/2025     04/15/2025    BUN 15 04/15/2025    CREATININE 0.59 04/15/2025    EGFRIFNONA 112 02/15/2018    EGFRIFAFRI >120 02/15/2018    GLUCOSE 93 04/15/2025    CALCIUM 9.6 04/15/2025    ALKPHOS 79 04/15/2025    AST 17 04/15/2025    ALT 8 04/15/2025    BILITOT 0.2 04/15/2025    ALBUMIN 4.4 04/15/2025    PROTEINTOT 8.1 04/15/2025    MG 2.2 (H) 04/26/2023       Radiology Results    NM Bone Scan Whole Body (04/23/2025 14:55)     IMPRESSION:    Focus of abnormal increased tracer activity localizing to either of  the right scapula or to a right posterior third and fourth rib which is  nonspecific. Osteoblastic metastasis not excluded. Otherwise stable  exam.       CT Abdomen Pelvis With Contrast (05/01/2025 08:40)     IMPRESSION:  1. No evidence of metastatic disease to abdomen or pelvis.  2. Mild diffuse fatty infiltration of liver. No focal liver lesions.  3.  Incidental note made of intestinal malrotation.  Ileocecal junction  and appendix is located in the left hemiabdomen.  4. Other incidental/nonacute findings above.      CT Chest With Contrast Diagnostic (05/01/2025 08:40)     IMPRESSION:  1.  No hilar or mediastinal adenopathy identified.  2.  No suspicious pulmonary nodules or masses.  3.  Stable bilateral axillary " lymphadenopathy.  4.  Largest left axillary lymph node is 2.4 x 1.2 cm and was previously  2.5 x 1.4 cm.  5.  Largest right axillary lymph node is 2.8 x 0.7 cm and was previously  2.9 x 0.9 cm.  6.  Mass left breast which extends to the skin surface, retroareolar in  location, is approximately 4.8 cm and would correspond to patient's  primary malignancy.    US Breast Left Limited (01/23/2025 13:45)   FINDINGS:  Interval increase in size of biopsy-proven malignant spiculated and microlobulated mass left breast, which mammographically measures 40 x 30 x 38 mm in the 3:00 position, spanning from T2 to 6 centimeters from the nipple. There is associated nipple tethering with spicules extending to the subareolar region. Biopsy clip is noted in the superior lateral aspect of this mass, biopsy-proven invasive ductal carcinoma BI-RADS 6.   A targeted left breast ultrasound reveals interval increase in size of the irregular heterogeneous mass with angular and spiculated borders with internal vascularity extending and abutting the nipple which measures 39 x 26 by by 32 mm correlating to mammogram. This has significantly increased in size when compared to most recent mammogram from 3/17/2023 at which time it measured approximately 24 x 14 x 20 mm. It has also also increased in size when compared to ultrasound performed 2/16/2023 at which time it measured 20 x 16 x 22 mm.   There is a stable 13 mm mass at the 10:00 position 4 cm from the nipple with oval circumscribed margins. Sonographic evaluation of this mass demonstrates a bilobed lobular hypoechoic parallel mass at the 4:00 position 10 cm from nipple measuring 13 x 4 x 6 mm, unchanged from prior likely reflecting a benign mass, mammographically and sonographically stable since 3/17/2023     Left axilla and left axillary tail lymph nodes: Additional sonographic evaluation was performed of the left axilla revealing multiple abnormal axillary lymph nodes, the most worrisome  are lymph nodes   1 and 2. Moderately concerning for metastatic regional axillary disease are also lymph nodes 3 and 4 as well as 3 axillary tail lymph nodes labeled at the 2:00 position     Left Breast BI-RADS Code: BI-RADS 4. Suspicious finding.   Left breast BI-RADS code: BI-RADS 6, biopsy-proven malignancy for finding 1.     IMPRESSION:   Left Breast Recommendations: Fine Needle Aspiration F axillary lymph node one or 2 if it would change patient's clinical or surgical management management     US Breast Right Limited (01/23/2025 13:45)   FINDINGS:   Right breast: No suspicious masses, microcalcifications or distortion in the right breast.   Right axilla: There are multiple right axillary lymph nodes, with borderline enlarged cortical thickening between 3 to 4 mm, the most prominent is right axillary lymph node #3 with cortical thickening up to 5 mm. This was not previously been sampled as recommended.     RECOMMENDATIONS:   Right Breast Recommendations: Fine Needle Aspiration of right axillary lymph node #3 as previously recommended if it changes patient's clinical or surgical management     IMPRESSION:   Right Breast BI-RADS Code:  BI-RADS 4. Suspicious finding.     Mammo Diagnostic Digital Tomosynthesis Bilateral With CAD (01/23/2025 11:47)   FINDINGS:   Right Breast BI-RADS Code:  BI-RADS 4. Suspicious finding.   Left Breast BI-RADS Code: BI-RADS 4. Suspicious finding.   Left breast BI-RADS code: BI-RADS 6, biopsy-proven malignancy for finding 1.     RECOMMENDATIONS:   Right Breast Recommendations: Fine Needle Aspiration of right axillary lymph node #3 as previously recommended if it changes patient's clinical or surgical management   Left Breast Recommendations: Fine Needle Aspiration F axillary lymph node one or 2 if it would change patient's clinical or surgical management management     Bilateral Mammogram Findings, Left Ultrasound Findings and Bilateral Axillary Sonographic Findings:     Bilateral  Breast Density: The breasts are heterogeneously dense, which may obscure small masses.     Right breast: No suspicious masses, microcalcifications or distortion in the right breast.     Left breast:   Finding 1: Interval increase in size of biopsy-proven malignant spiculated and microlobulated mass left breast, which mammographically measures 40 x 30 x 38 mm in the 3:00 position, spanning from T2 to 6 centimeters from the nipple. There is associated nipple tethering with spicules extending to the subareolar region. Biopsy clip is noted in the superior lateral aspect of this mass, biopsy-proven invasive ductal carcinoma BI-RADS 6.     A targeted left breast ultrasound reveals interval increase in size of the irregular heterogeneous mass with angular and spiculated borders with internal vascularity extending and abutting the nipple which measures 39 x 26 by by 32 mm correlating to mammogram. This has significantly increased in size when compared to most recent mammogram from 3/17/2023 at which time it measured approximately 24 x 14 x 20 mm. It has also also increased in size when compared to ultrasound performed 2/16/2023 at which time it measured 20 x 16 x 22 mm.     Finding 2: There is a stable 13 mm mass at the 10:00 position 4 cm from the nipple with oval circumscribed margins. Sonographic evaluation of this mass demonstrates a bilobed lobular hypoechoic parallel mass at the 4:00 position 10 cm from nipple measuring 13 x 4 x 6 mm, unchanged from prior likely reflecting a benign mass, mammographically and sonographically stable since 3/17/2023.     Bilateral axillary ultrasound was performed in additional evaluation of the prior suspicious bilateral axillary lymph nodes.     Right axilla: There are multiple right axillary lymph nodes, with borderline enlarged cortical thickening between 3 to 4 mm, the most prominent is right axillary lymph node #3 with cortical thickening up to 5 mm. This was not previously been  sampled as recommended.     Left axilla and left axillary tail lymph nodes: Additional sonographic evaluation was performed of the left axilla revealing multiple abnormal axillary lymph nodes, the most worrisome are lymph nodes   1 and 2. Moderately concerning for metastatic regional axillary disease are also lymph nodes 3 and 4 as well as 3 axillary tail lymph nodes labeled at the 2:00 position     NM BONE SCAN WHOLE BODY (09/22/2023 13:32)   FINDINGS:   Bones: No suspicious focal sites of increased or decreased radiotracer uptake suggestive of bone metastases.   No new sites of focal increased decreased radiotracer uptake.   Bones/Joints: Focal increased uptake involving shoulders, acromioclavicular joints, sternoclavicular joints, elbows, wrists and hands, thoracic spine, lumbosacral spine, sacroiliac joints, hips (left greater than right), knees (right greater than left), ankles and feet (left greater than right), consistent with benign/arthritic/degenerative/post-traumatic changes.   Soft-tissues: Normal. Two kidneys visualized.     IMPRESSION:   No bone metastasis     CT CHEST W CONTRAST DIAGNOSTIC (09/22/2023 11:26)   FINDINGS:   Mediastinum and Pleura: No mediastinal or hilar adenopathy. No pleural or pericardial effusion.   Lungs: No suspicious pulmonary nodules.   Abdomen and Pelvis: No suspicious solid organ lesions. No abdominal or pelvic adenopathy. Normal appearance of the pelvic viscera. No focal GI tract abnormalities.   Musculoskeletal: No suspicious lytic or sclerotic bone lesions. T1 bone lesion is likely a benign bone island. Similar appearance of enhancing nodule in the left breast, adjacent to the biopsy clip. Multiple bilateral axillary lymph nodes, most of which are not enlarged by CT size criteria. The largest lymph node in the left axilla measures 14 mm short axis versus 12. Other left axillary lymph nodes appear slightly decreased.     IMPRESSION:   1. No evidence of disease progression.    2. Stable left breast lesion and roughly stable bilateral axillary lymph nodes.     CT ABDOMEN PELVIS W CONTRAST (09/22/2023 11:26)   FINDINGS:   Mediastinum and Pleura: No mediastinal or hilar adenopathy. No pleural or pericardial effusion.   Lungs: No suspicious pulmonary nodules.   Abdomen and Pelvis: No suspicious solid organ lesions. No abdominal or pelvic adenopathy. Normal appearance of the pelvic viscera. No focal GI tract abnormalities.   Musculoskeletal: No suspicious lytic or sclerotic bone lesions. T1 bone lesion is likely a benign bone island. Similar appearance of enhancing nodule in the left breast, adjacent to the biopsy clip. Multiple bilateral axillary lymph nodes, most of which are not enlarged by CT size criteria. The largest lymph node in the left axilla measures 14 mm short axis versus 12. Other left axillary lymph nodes appear slightly decreased.     IMPRESSION:   1. No evidence of disease progression.   2. Stable left breast lesion and roughly stable bilateral axillary lymph nodes.     DEXA BONE DENSITY AXIAL (09/22/2023 09:02)   FINDINGS:   Based on WHO criteria (post-menopausal female) the diagnosis is Osteopenia       The lowest T- score is -1.1 in the LFN   FRAX (10-year probability of fracture) - Major Osteoporotic:  6.6 %;  Hip:    0.4 %     TREATMENT RECOMMENDATIONS:    Measured bone density does not cross threshold for treatment   Work up for secondary osteoporosis and metabolic bone disease could be   considered based on clinical indications.   Suggest general measures to optimize calcium and vitamin D status, fall   prevention measures and reduce fracture risk.   Consider repeating this study in 2 year(s) or as clinically indicated to   assess bone density change or response to treatment (should be performed   on the same DXA scanner to allow for direct comparison and calculation of   change in BMD).     US BREAST LEFT LIMITED (03/17/2023 13:30)   Left Breast BI-RADS Code:     BI-RADS 6, Biopsy proven malignancy for finding 1   BI-RADS 3, probably benign lesion for finding 2     Left Breast Recommendations: Diagnostic Mammogram in 6 Months  Breast Ultrasound 6 Months for follow-up of finding 2. Medical/Surgical follow up for known malignancy.     Left breast:   Finding 1: Interval decrease in size of irregular mass with associated architectural distortion and tissue marker in the lateral left breast. This corresponds to biopsy-proven malignancy. The mass measures approximately 25 mm, previously 34 mm mammographically in April 2022. However, this mammographic measurement is similar to sonographic measurement from most recent ultrasound February 2023.     Finding 2: There is a 9 mm oval, circumscribed mass in the upper inner left breast, 4 cm from the nipple. Finding appears stable since 2022. Ultrasound was performed for further evaluation.     US BREAST RIGHT LIMITED (03/17/2023 13:29)   Right breast:   Finding 1: No mammographic correlate for palpable concern in the right axilla. Ultrasound was performed for further evaluation.   No suspicious masses, calcifications, or architectural distortion.     Focused ultrasound was performed in the region of palpable concern in the right axilla. Ultrasound demonstrates several lymph nodes with mild cortical thickening. The most concerning node is labeled as #3.     MAMMO DIAGNOSTIC DIGITAL TOMOSYNTHESIS BILATERAL W CAD (03/17/2023 11:49)   Mammogram Findings:   Right breast:   Finding 1: No mammographic correlate for palpable concern in the right axilla. Ultrasound was performed for further evaluation.   No suspicious masses, calcifications, or architectural distortion.     Left breast:   Finding 1: Interval decrease in size of irregular mass with associated architectural distortion and tissue marker in the lateral left breast. This corresponds to biopsy-proven malignancy. The mass measures approximately 25 mm, previously 34 mm mammographically in  April 2022. However, this mammographic measurement is similar to sonographic measurement from most recent ultrasound February 2023.   Finding 2: There is a 9 mm oval, circumscribed mass in the upper inner left breast, 4 cm from the nipple. Finding appears stable since 2022. Ultrasound was performed for further evaluation.     US BREAST LEFT LIMITED (02/16/2023 14:57)   FINDINGS:   Finding 1: Follow-up ultrasound was performed at 3:00, 7 cm from the nipple. Overall stable size of irregular hypoechoic mass with angular margins measuring 2.4 x 2.0 x 1.6 cm. This corresponds to biopsy-proven malignancy.   Finding 2:  Follow-up ultrasound was performed of the left axilla. Ultrasound redemonstrates 4 morphologically abnormal-appearing lymph nodes with cortical thickening which are similar in size compared to prior.     IMPRESSION:   BI-RADS: BI-RADS 6, Biopsy proven malignancy   RECOMMENDATIONS: Medical/Surgical follow up     NM BONE SCAN WHOLE BODY (02/16/2023 13:09)   FINDINGS:   Bones: No sites of focal increased (hot) or decreased (cold) uptake suggestive of bone metastasis(es). No new sites of increased (hot) or decreased (cold) uptake suggestive of new bone metastases. Specifically, known sclerotic lesion in T1 vertebral body not metabolically active by planar imaging.   Bones/Joints: Focal moderately intense increased uptake involving maxilla(s) and/or mandible(s), shoulders, acromioclavicular joints, sternoclavicular joints, elbows, wrists and hands, thoracic spine, lumbosacral spine, sacroiliac joints, hips (left greater than right), knees (right greater than left), ankles (left greater than right) and feet, consistent with benign/arthritic/degenerative/post-traumatic changes.   Soft-tissues: Normal. Two kidneys visualized.     IMPRESSION:   No bone metastasis.   Compared to Previous: No significant change     US BREAST LEFT LIMITED (09/20/2022 15:13)   FINDINGS:  Finding 1: Follow-up ultrasound was performed at  3:00, 7 cm from the nipple. Overall stable size of irregular hypoechoic mass with angular margins measuring 2.4 x 2.3 x 1.3 cm, previously 2.4 x 2.0 x 1.4 cm.   Finding 2:  Follow-up ultrasound was performed of the left axilla. Ultrasound redemonstrates 4 morphologically abnormal-appearing lymph nodes with cortical thickening. Lymph nodes #3 and 4 slightly smaller. Remainder similar in size compared to prior     IMPRESSION:   BI-RADS: BI-RADS 6, Biopsy proven malignancy   RECOMMENDATIONS: Medical/Surgical follow up     US BREAST LEFT LIMITED (08/05/2022 13:56)   FINDINGS:  Finding 1: Focused ultrasound was performed at 3:00, 7 cm from the nipple. Overall stable size of irregular hypoechoic mass with angular margins measuring 2.4 x 2.0 x 1.4 cm, previously measured as 2.6 x 2.0 cm.     Finding 2: Focused ultrasound was performed of the left axilla. Ultrasound demonstrates 4 morphologically abnormal-appearing lymph nodes. This includes stable lymph node with diffuse cortical thickening measuring 16 mm (#1). There are additional abnormal lymph nodes with diffuse cortical thickening '    IMPRESSION:   BI-RADS: BI-RADS 6, Biopsy proven malignancy   RECOMMENDATIONS: Medical/Surgical follow up     MRI Breast Bilateral With & Without Contrast (05/17/2022 15:38)   FINDINGS:   There is minimal bilateral background contrast enhancement and  normal vascular enhancement.  There are no worrisome areas of contrast enhancement in the right  breast.  Correlating to the biopsy-proven malignancy in the left 3:00 region is  an approximately 4.2 cm irregular rapidly enhancing mass. There are no  additional worrisome areas of contrast enhancement in the left breast.  Several enlarged left axillary lymph nodes are noted. No chest wall  abnormality is seen.     IMPRESSION:  1. Negative right breast MRI.  2. Biopsy-proven malignancy in the left 3:00 region measuring 4.2 cm  associated with left axillary lymphadenopathy. There are no  additional  worrisome findings in the left breast.     RECOMMENDATIONS: Recommend surgical follow-up.     BI-RADS CATEGORY: 6, KNOWN BIOPSY PROVEN MALIGNANCY        Pathology:   05/09/2022 05/06/2022                Endoscopy:     Assessment / Plan         Assessment and Plan   Brayan Chan is a 54 y.o. presents for     (L) breast invasive ductal carcinoma    Cancer Staging   Stage IIB (cT2, cN1(f), cM0, G3, ER+, OR+, HER2-)  -Diagnosed with screening mammogram in 2022, established care with general surgery, Dr. Lopez. Patient was recommended to have neoadjuvant chemotherapy, which she declined. Was offered surgery alone, which she declined. Has established care with Elaine Truong MD, medical oncologist at [facility]. Attempted AI briefly, which she discontinued due to attributed side effects. Has been taking herbal supplements and teas (unsure of names), currently taking Fenbendazole and methylene blue for the last 3-4 months.   -Established care with me 4/15/2025.   -Most recent mammogram 1/23/2025: L breast with interval increase measures 40 x 30 x 38 mm in 3:00 position, 6 cm from nipple. Associated tethering with spicules extending to subareolar region. Left axilla and axillary tail reveal multiple abnormal axillary lymph nodes, moderately concerning for metastatic regional axillary disease as well as 3 axillary tail lymph nodes. Right axilla multiple lymph nodes, borderline enlarged cortical thickening between 3-4 mm, most prominent is right axillary lymph node #3 with cortical thickening up to 5 mm. Further staging studies were ordered, which patient had not done at [facility]  -Staging studies done at [facility]: NM PET scan which shows focus abnormal increased tracer activity localizing to either of the right scapula or to a right posterior third and fourth rib, which is non specific. Osteoblastic metastasis not excluded. CT of the chest with stable bilateral axillary lymphadenopathy, left axillary 2.4 x 1.2 cm and right  "axillary 2.8 x 0.7 cm. Mass of left breast which extends to skin surface approximately 4.8 cm.   -She believes her cancer has not spread due to devotion and prayers to God. Still refusing chemo and endocrine therapy or anything that \"makes her feel bad.\" She is willing to have surgery. Requesting referral to [facility] for further discussion as she has read about alternative tx on their website.     2. Malignancy associated pain  - Currently denies         Discussed possible differential diagnoses, testing, treatment, recommended non-pharmacological interventions, risks, warning signs to monitor for that would indicate need for follow-up in clinic or ER. If no improvement with these regimens or you have new or worsening symptoms follow-up. Patient verbalizes understanding and agreement with plan of care. Denies further needs or concerns.     Patient was given instructions and counseling regarding her condition and for health maintenance advised.       All questions were answered to her satisfaction.    BMI cannot be calculated due to outdated height or weight values.  Please input a current height/weight in Vitals and re-renter BMIFOLLOWUP in Note to pull in correct documentation based on BMI range.     ACO / MARK/Other  Quality measures  -  Brayan Chan did not receive 2024 flu vaccine.  This was recommended.  -  Brayan Chan reports a pain score of 0.  Given her pain assessment as noted, treatment options were discussed and the following options were decided upon as a follow-up plan to address the patient's pain: continuation of current treatment plan for pain.  -  Current outpatient and discharge medications have been reconciled for the patient.  Reviewed by: Esther Reyes MD        Meds ordered during this visit  No orders of the defined types were placed in this encounter.        Visit Diagnoses    ICD-10-CM ICD-9-CM   1. Malignant neoplasm of overlapping sites of left female breast, unspecified estrogen " receptor status  C50.812 174.8   2. Malignant neoplasm of overlapping sites of left breast in female, estrogen receptor positive  C50.812 174.8    Z17.0 V86.0         Follow Up   In 6 weeks to discuss her decision regarding treatment        This document has been electronically signed by Esther Reyes MD   May 7, 2025 14:42 EDT    Dictated Utilizing Dragon Dictation: Part of this note may be an electronic transcription/translation of spoken language to printed text using the Dragon Dictation System.

## 2025-05-22 ENCOUNTER — PATIENT OUTREACH (OUTPATIENT)
Dept: ONCOLOGY | Facility: CLINIC | Age: 55
End: 2025-05-22
Payer: COMMERCIAL

## 2025-05-22 NOTE — SIGNIFICANT NOTE
Called patient and gave surgical planning appointment with Dr. Lopez for 6/26 at 10:50 at [facility] and sent in the mail. Pt RBV time, date, and location.

## 2025-06-27 ENCOUNTER — OFFICE VISIT (OUTPATIENT)
Dept: SURGERY | Facility: CLINIC | Age: 55
End: 2025-06-27
Payer: COMMERCIAL

## 2025-06-27 ENCOUNTER — PATIENT OUTREACH (OUTPATIENT)
Dept: ONCOLOGY | Facility: CLINIC | Age: 55
End: 2025-06-27
Payer: COMMERCIAL

## 2025-06-27 VITALS
OXYGEN SATURATION: 98 % | BODY MASS INDEX: 34.82 KG/M2 | HEIGHT: 62 IN | DIASTOLIC BLOOD PRESSURE: 82 MMHG | WEIGHT: 189.2 LBS | SYSTOLIC BLOOD PRESSURE: 118 MMHG | HEART RATE: 82 BPM

## 2025-06-27 DIAGNOSIS — Z17.0 MALIGNANT NEOPLASM OF LEFT BREAST IN FEMALE, ESTROGEN RECEPTOR POSITIVE, UNSPECIFIED SITE OF BREAST: Primary | ICD-10-CM

## 2025-06-27 DIAGNOSIS — C50.912 MALIGNANT NEOPLASM OF LEFT BREAST IN FEMALE, ESTROGEN RECEPTOR POSITIVE, UNSPECIFIED SITE OF BREAST: Primary | ICD-10-CM

## 2025-06-27 NOTE — SIGNIFICANT NOTE
Met with patient during a follow up with Dr. Lopez. Patient was here to see what her surgery options are for breast cancer. Recommendations were given and patient knows that she is to call us when she makes the decision on surgery. NN addressed understanding of plan of care. NN contact information was provided and encouraged patient to call with any questions or concerns. Pt. Verbalized understanding. NN will follow and assist PRN   negative Alert & oriented; no sensory, motor or coordination deficits, normal reflexes

## 2025-06-27 NOTE — PROGRESS NOTES
"Subjective   Brayan Chan is a 55 y.o. female is here today for follow-up.    History of Present Illness  Ms. Chan was seen in the office today for left breast cancer.  She was actually initially diagnosed in July 2022 with a clinical T2 N1 invasive ductal carcinoma.  At that point despite multiple attempts the patient could not be persuaded to have either surgery, chemotherapy or radiation.  She subsequently ended up at  where she has been followed for some time although she has canceled multiple appointments thereto.  She did states she took an aromatase inhibitor at 1 point in time.  She does feel that the tumor has grown.  She had a PET scan done at .  Bone scan done in April which showed some questionable rib involvement but no other definite metastatic disease.  She sought medical oncology consultation here in [place] but again refused to consider either chemotherapy or radiation.  She does states she was willing to discuss surgical intervention.  No Known Allergies    Current Outpatient Medications   Medication Sig Dispense Refill    Garlic 3 MG capsule Take 2 mg by mouth 3 times a day.      Turmeric (QC Tumeric Complex) 500 MG capsule Take 2 mg by mouth 2 (Two) Times a Day.       No current facility-administered medications for this visit.     Past Medical History:   Diagnosis Date    Anemia     Arthritis     Arthritis     Lupus     PONV (postoperative nausea and vomiting)      Past Surgical History:   Procedure Laterality Date    TONSILLECTOMY      TONSILLECTOMY         Pertinent Review of Systems  Negative for chest pain or shortness of breath    Objective   /82   Pulse 82   Ht 157.5 cm (62\")   Wt 85.8 kg (189 lb 3.2 oz)   SpO2 98%   BMI 34.61 kg/m²    Physical Exam  Well-developed well-nourished female  Neck:  No supraclavicular adenopathy  Lungs:  Respiratory effort normal. Auscultation: Clear, without wheezes, rhonchi, rales  Heart:  Regular rate and rhythm, without murmur, gallop, rub. "  No pedal edema  Breasts: On visual inspection there is an obvious deformity of the left breast..  Examination of the right breast demonstrates no discrete mass, skin change, or axillary adenopathy..  Examination of the left breast demonstrates a large mass involving the dermis of the lower outer quadrant as well as positive axillary nodes..       Procedures     Results/Data:  Imaging: Imaging in clinic most recent mammogram and ultrasound from January metastatic workup including bone scan and CTs from April, 2025 reviewed.  Notes: Records from  were reviewed.      Assessment & Plan   Clinical T4 N1 invasive ductal carcinoma of the left breast, ER positive, TX positive, HER2 negative.    Plan: At this point patient's only surgical option is a left modified radical mastectomy.  She did ask about radiation and chemotherapy although she has refused both of these for 3 years.  The mastectomy procedure was again explained to the patient as to what to expect postoperatively.  She is aware that potentially it would not be curative.       Discussion/Summary: Patient really does not seem to grasp that and not making a decision to go ahead with any treatment she is in fact making a decision that she is going to die from metastatic breast cancer which could have been preventable.  She has a young daughter who will be impacted by her decisions as well.  The patient was advised to call if she wishes to schedule the mastectomy although giving her unwillingness to act in her own best interest over the last 3 years I do not have strong hopes that she will agree to surgery at this time.    Time spent:     BMI is >= 30 and <35. (Class 1 Obesity). The following options were offered after discussion;: weight loss educational material (shared in after visit summary)       @[email or web address]    This document has been electronically signed by        June 29, 2025 23:44 EDT    Please note that portions of this note were completed with a voice  recognition program.

## 2025-07-07 ENCOUNTER — PATIENT OUTREACH (OUTPATIENT)
Dept: RADIATION ONCOLOGY | Facility: HOSPITAL | Age: 55
End: 2025-07-07
Payer: COMMERCIAL

## 2025-07-07 NOTE — SIGNIFICANT NOTE
Followed up with patient to check if she had decided on surgery. Patient did not answer I left a voicemail and what it concerned and encouraged her to call back if she had decided on surgery or not.